# Patient Record
Sex: FEMALE | Race: WHITE | NOT HISPANIC OR LATINO
[De-identification: names, ages, dates, MRNs, and addresses within clinical notes are randomized per-mention and may not be internally consistent; named-entity substitution may affect disease eponyms.]

---

## 2018-04-22 PROBLEM — Z00.00 ENCOUNTER FOR PREVENTIVE HEALTH EXAMINATION: Status: ACTIVE | Noted: 2018-04-22

## 2018-05-10 ENCOUNTER — RECORD ABSTRACTING (OUTPATIENT)
Age: 78
End: 2018-05-10

## 2018-05-10 DIAGNOSIS — Z78.9 OTHER SPECIFIED HEALTH STATUS: ICD-10-CM

## 2018-05-16 ENCOUNTER — APPOINTMENT (OUTPATIENT)
Dept: BREAST CENTER | Facility: CLINIC | Age: 78
End: 2018-05-16

## 2018-05-22 ENCOUNTER — APPOINTMENT (OUTPATIENT)
Dept: BREAST CENTER | Facility: CLINIC | Age: 78
End: 2018-05-22
Payer: MEDICARE

## 2018-05-22 VITALS
BODY MASS INDEX: 36.8 KG/M2 | HEART RATE: 82 BPM | HEIGHT: 62 IN | DIASTOLIC BLOOD PRESSURE: 86 MMHG | SYSTOLIC BLOOD PRESSURE: 171 MMHG | WEIGHT: 200 LBS

## 2018-05-22 PROCEDURE — 99215 OFFICE O/P EST HI 40 MIN: CPT

## 2018-07-31 ENCOUNTER — APPOINTMENT (OUTPATIENT)
Dept: BREAST CENTER | Facility: CLINIC | Age: 78
End: 2018-07-31
Payer: MEDICARE

## 2018-07-31 VITALS
DIASTOLIC BLOOD PRESSURE: 78 MMHG | SYSTOLIC BLOOD PRESSURE: 151 MMHG | BODY MASS INDEX: 36.8 KG/M2 | WEIGHT: 200 LBS | HEIGHT: 62 IN | HEART RATE: 71 BPM

## 2018-07-31 PROCEDURE — 99215 OFFICE O/P EST HI 40 MIN: CPT

## 2019-02-15 ENCOUNTER — APPOINTMENT (OUTPATIENT)
Dept: BREAST CENTER | Facility: CLINIC | Age: 79
End: 2019-02-15
Payer: MEDICARE

## 2019-02-15 VITALS
BODY MASS INDEX: 36.8 KG/M2 | WEIGHT: 200 LBS | HEART RATE: 63 BPM | SYSTOLIC BLOOD PRESSURE: 119 MMHG | DIASTOLIC BLOOD PRESSURE: 62 MMHG | HEIGHT: 62 IN

## 2019-02-15 PROCEDURE — 99215 OFFICE O/P EST HI 40 MIN: CPT

## 2019-02-15 NOTE — REVIEW OF SYSTEMS
[Fever] : fever [Loss Of Hearing] : hearing loss [Cough] : cough [Diarrhea] : diarrhea [Joint Pain] : joint pain [Breast Lump] : breast lump [Hot Flashes] : hot flashes [Negative] : Heme/Lymph

## 2019-02-15 NOTE — HISTORY OF PRESENT ILLNESS
[FreeTextEntry1] : This is a 79 year old female s/p right axillary dissection on 08/18/2017. Pathology showed negative for metastasis in 0/4 lymph nodes, no residual carcinoma identified. Patient is s/p right PMX and right excisional biopsy on 07/13/2017. 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 Patient is s/p right breast MRI guided biopsy on 06/19/2017. Pathology showed right breast outer: mildly dilated ducts in a background of predominantly adipocytic mammary parenchyma with patchy stromal fibrosis, right breast retro: small duct papillomata; duct ectasia; sparse intraluminal calcifications; stromal sclerosis (fibrosis). Patient is s/p right nipple punch biopsy on 06/14/2017. Pathology showed right nipple IDC, grade 5/9, up to 3mm, suspicious for dermal lymphatic invasion, ER/DC positive, HER2 negative, Ki67 low. Breast triple stain shows cytokeratin 8/18 positive epithelial cells without surrounding myoepithelial cells (CK5 an P63 negative). Patient originally referred by Dr. Ty for a right nipple inversion.\par \par Patient completed six months of IV CMF (Dr. Ortiz) on 02/01/2018. Patient was last seen by Dr. Ortiz on 05/10/2018 . Patient is s/p  radiation therapy on  4/20/2018(Dr. Siegel).  She started anastrozole therapy on 5/11/2018. She has c/o of nausea when she is tired since she started it. She also has c/o of insomnia and leg cramps since the anastrozole.  She has taken Zofran for these symptoms with relief. \par \par Patient is s/p left breast usd guided biopsy (heart shaped clip) on 01/26/2018. Pathology showed left retroareolar breast demonstrated focal UDH, duct ectasia with luminal histiocytes and periductal fibrosis sclerosis, apocrine cysts. Patient states the  right arm numbness is improving . Patient c/o  of right breast pain is improving.  The right breast lump  is unchanged. \par \par She completed a CMF chemotherapy on January 2018. She completed radiation therapy on April 20, 2018. A total of 6040 cGy was delivered to the right breast tumor bed. 2 weeks ago she noted to have right arm and breast swelling. She saw Dr. Ortiz and was referred for physical therapy. She has an appointment next week. She had recent negative breast imaging.\par \par \par She is on Arimidex.\par She fell in June at her home in the front yard. She sustained some pain to her right sided thorax. But did not break anything or have any injuries.\par \par She does SBE. \par She has not noticed a change in her breast or any left breast lump. Patient noticed right breast lump. \par She has noticed a change in her nipple or nipple area. \par She has  noticed a change in the skin of the breast.  \par She is not experiencing nipple discharge.\par She is not experiencing any left breast pain. \par She has not noticed a lump or lymph node under the armpit. \par \par BREAST CANCER RISK FACTORS\par Menarche: 11\par Menopause: 44\par Grav: 5     Para:  4\par Age at first live birth: 20\par Nursed: no\par Hysterectomy: no \par Oophorectomy: no\par OCP: yes  on and off for approx 10-15 years\par HRT: no\par Last pap/pelvic exam: 4/2017 WNL\par Related family history:  mother BCA@55\par Ashkenazi: no\par Mastery risk assessment:  BRCA 2.2%, TCv6 6.1%, TCv7 4.7%, Shannon 5.36%, Kirill 0.3%\par BRCA testing: no\par Bra size: 42B\par \par Last mammogram:    01/24/2019                       Location: [facility]\par Report reviewed.                                 Images reviewed on PACS\par Results: Birads 2\par Postsurgical change in the right breast. No evidence of malignancy \par \par Last ultrasound:       2/8/19     Location: [facility] \par Report reviewed.                                 Images reviewed. \par Results: Birads 2\par Left retroareolar duct with echogenic material. USD guided biopsy recommended. Done on 01/26/2018. \par \par Last MRI:    06/12/2017                                            Location: [facility]\par Report reviewed.                                 Images reviewed. \par Results: Birads 4\par Right breast: Nipple retraction with intense enhancement of the retracted nipple. Branching linear enhancement at 11:00 and a focus of nodular enhancement at 9:00; MR guided biopsy recommended. \par Repeat targeted usd of the breast shows a focus of retroareolar ductal dilatation and a probable mildly complicated subcutaneous cyst measuring 3mm at 10:00.  Left breast: No evidence of malignancy\par

## 2019-02-15 NOTE — ASSESSMENT
[FreeTextEntry1] : 79-year-old female followed for right breast cancer stage IIA, TURNER\par Plan bilateral mammogram ultrasound in FEB 2020\par Continue Arimidex and surveillance per Dr. Ortiz, \par Recommend topical vitamin E topical therapy to right breast central\par PT with [facility], reviewed stretching and arm exercises, also advised arm elevation\par Followup with me in 6 months for clinical breast exam\par Will review recommendations for breast MRI at next visit\par She knows to call or return sooner should any concerns or questions arise.\par \par

## 2019-02-15 NOTE — PHYSICAL EXAM
[Normocephalic] : normocephalic [Atraumatic] : atraumatic [Supple] : supple [No Supraclavicular Adenopathy] : no supraclavicular adenopathy [Examined in the supine and seated position] : examined in the supine and seated position [No dominant masses] : no dominant masses in right breast  [No dominant masses] : no dominant masses left breast [No Nipple Retraction] : no left nipple retraction [No Nipple Discharge] : no left nipple discharge [Breast Mass Left Breast ___cm] : no masses [No Axillary Lymphadenopathy] : no left axillary lymphadenopathy [No Edema] : no edema [No Rashes] : no rashes [No Ulceration] : no ulceration [de-identified] : s/p PMx, 3cm firm area of fat necrosis, stable without change central incision, mild brawny induration c/w RTx, bra indentation lines [de-identified] : healed axillary incision, slight arm swelling

## 2019-02-19 ENCOUNTER — APPOINTMENT (OUTPATIENT)
Dept: BREAST CENTER | Facility: CLINIC | Age: 79
End: 2019-02-19

## 2019-07-02 ENCOUNTER — APPOINTMENT (OUTPATIENT)
Dept: BREAST CENTER | Facility: CLINIC | Age: 79
End: 2019-07-02
Payer: MEDICARE

## 2019-07-02 VITALS
DIASTOLIC BLOOD PRESSURE: 83 MMHG | WEIGHT: 200 LBS | HEIGHT: 62 IN | HEART RATE: 73 BPM | SYSTOLIC BLOOD PRESSURE: 158 MMHG | BODY MASS INDEX: 36.8 KG/M2

## 2019-07-02 PROCEDURE — 99214 OFFICE O/P EST MOD 30 MIN: CPT

## 2019-07-02 RX ORDER — ANASTROZOLE TABLETS 1 MG/1
1 TABLET ORAL
Refills: 0 | Status: DISCONTINUED | COMMUNITY
End: 2019-07-02

## 2019-07-02 NOTE — ASSESSMENT
[FreeTextEntry1] : 79-year-old female followed for right breast cancer stage IIA, TURNER\par Plan bilateral mammogram ultrasound in FEB 2020\par Continue Arimidex and surveillance per Dr. Ortiz, \par Recommend topical vitamin E topical therapy to right breast central\par PT with [facility], reviewed stretching and arm exercises, also advised arm elevation\par Followup with me in 6 months for clinical breast exam\par rec hand surgeon and pcp for pannus wound and for evaluation of UE weakness\par Will review recommendations for breast MRI at next visit\par She knows to call or return sooner should any concerns or questions arise.\par \par

## 2019-07-02 NOTE — HISTORY OF PRESENT ILLNESS
[FreeTextEntry1] : This is a 79 year old female s/p right axillary dissection on 08/18/2017. Pathology showed negative for metastasis in 0/4 lymph nodes, no residual carcinoma identified. Patient is s/p right PMX and right excisional biopsy on 07/13/2017. 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 Patient is s/p right breast MRI guided biopsy on 06/19/2017. Pathology showed right breast outer: mildly dilated ducts in a background of predominantly adipocytic mammary parenchyma with patchy stromal fibrosis, right breast retro: small duct papillomata; duct ectasia; sparse intraluminal calcifications; stromal sclerosis (fibrosis). Patient is s/p right nipple punch biopsy on 06/14/2017. 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 Breast triple stain shows cytokeratin 8/18 positive epithelial cells without surrounding myoepithelial cells (CK5 an P63 negative). Patient originally referred by Dr. Ty for a right nipple inversion.\par \par Patient completed six months of IV CMF (Dr. Ortiz) on 02/01/2018. Patient was last seen by Dr. Ortiz on 05/10/2018 . Patient is s/p  radiation therapy on  4/20/2018(Dr. Siegel).  She started anastrozole therapy on 5/11/2018. She has c/o of nausea when she is tired since she started it. She also has c/o of insomnia and leg cramps since the anastrozole.  She has taken Zofran for these symptoms with relief. \par \par Patient is s/p left breast usd guided biopsy (heart shaped clip) on 01/26/2018. Pathology showed left retroareolar breast demonstrated focal UDH, duct ectasia with luminal histiocytes and periductal fibrosis sclerosis, apocrine cysts. Patient states the  right arm numbness is improving . Patient c/o  of right breast pain is improving.  The right breast lump  is unchanged. \par She fell in June 2018 at her home in the front yard. She sustained some pain to her right sided thorax. But did not break anything or have any injuries.\par \par \par She completed a CMF chemotherapy on January 2018. She completed radiation therapy on April 20, 2018. A total of 6040 cGy was delivered to the right breast tumor bed. 2 weeks ago she noted to have right arm and breast swelling. She saw Dr. Ortiz and was referred for physical therapy. She has been seeing [facility] for the past 3 months. She c/o right breast decreased  strength and feels her fingers on her right hand feels "weird" and this sensation wasn't there before. She mentioned to her PT.\par \par She is on Arimidex.\par \par She does SBE. \par She has not noticed a change in her breast or any left breast lump. Patient noticed right breast lump. \par She has noticed a change in her nipple or nipple area. \par She has  noticed a change in the skin of the breast.  \par She is not experiencing nipple discharge.\par She is not experiencing any left breast pain. \par She has not noticed a lump or lymph node under the armpit. \par \par BREAST CANCER RISK FACTORS\par Menarche: 11\par Menopause: 44\par Grav: 5     Para:  4\par Age at first live birth: 20\par Nursed: no\par Hysterectomy: no \par Oophorectomy: no\par OCP: yes  on and off for approx 10-15 years\par HRT: no\par Last pap/pelvic exam: 4/2017 WNL\par Related family history:  mother BCA@55\par Ashkenazi: no\par Mastery risk assessment:  BRCA 2.2%, TCv6 6.1%, TCv7 4.7%, Shannon 5.36%, Kirill 0.3%\par BRCA testing: no\par Bra size: 42B\par \par Last mammogram:    01/24/2019                       Location: [facility]\par Report reviewed.                                 Images reviewed on PACS\par Results: Birads 2\par Postsurgical change in the right breast. No evidence of malignancy \par \par Last ultrasound:       2/8/19     Location: [facility] \par Report reviewed.                                 Images reviewed. \par Results: Birads 2\par Left retroareolar duct with echogenic material. USD guided biopsy recommended. Done on 01/26/2018. \par \par Last MRI:    06/12/2017                                            Location: [facility]\par Report reviewed.                                 Images reviewed. \par Results: Birads 4\par Right breast: Nipple retraction with intense enhancement of the retracted nipple. Branching linear enhancement at 11:00 and a focus of nodular enhancement at 9:00; MR guided biopsy recommended. \par Repeat targeted usd of the breast shows a focus of retroareolar ductal dilatation and a probable mildly complicated subcutaneous cyst measuring 3mm at 10:00.  Left breast: No evidence of malignancy\par

## 2019-07-02 NOTE — PHYSICAL EXAM
[Normocephalic] : normocephalic [Atraumatic] : atraumatic [Supple] : supple [No Supraclavicular Adenopathy] : no supraclavicular adenopathy [Examined in the supine and seated position] : examined in the supine and seated position [No dominant masses] : no dominant masses in right breast  [No dominant masses] : no dominant masses left breast [No Nipple Retraction] : no left nipple retraction [No Nipple Discharge] : no left nipple discharge [Breast Mass Left Breast ___cm] : no masses [No Axillary Lymphadenopathy] : no left axillary lymphadenopathy [No Edema] : no edema [No Rashes] : no rashes [No Ulceration] : no ulceration [No Swelling] : no swelling [Full ROM] : full range of motion [de-identified] : s/p PMx, 2cm firm area of fat necrosis, stable without change central incision, mild brawny induration c/w RTx, bra indentation lines [de-identified] : healed axillary incision,no arm swelling, wearing a sleeve [de-identified] :  strength is equal bilaterally

## 2019-08-09 ENCOUNTER — APPOINTMENT (OUTPATIENT)
Dept: BREAST CENTER | Facility: CLINIC | Age: 79
End: 2019-08-09
Payer: MEDICARE

## 2019-08-09 VITALS
HEIGHT: 62 IN | BODY MASS INDEX: 35.88 KG/M2 | SYSTOLIC BLOOD PRESSURE: 139 MMHG | WEIGHT: 195 LBS | DIASTOLIC BLOOD PRESSURE: 77 MMHG | HEART RATE: 59 BPM

## 2019-08-09 PROCEDURE — 99215 OFFICE O/P EST HI 40 MIN: CPT

## 2019-08-09 NOTE — HISTORY OF PRESENT ILLNESS
[FreeTextEntry1] : This is a 79 year old female s/p right axillary dissection on 08/18/2017. Pathology showed negative for metastasis in 0/4 lymph nodes, no residual carcinoma identified. Patient is s/p right PMX and right excisional biopsy on 07/13/2017. 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 Patient is s/p right breast MRI guided biopsy on 06/19/2017. Pathology showed right breast outer: mildly dilated ducts in a background of predominantly adipocytic mammary parenchyma with patchy stromal fibrosis, right breast retro: small duct papillomata; duct ectasia; sparse intraluminal calcifications; stromal sclerosis (fibrosis). Patient is s/p right nipple punch biopsy on 06/14/2017. Pathology showed right nipple IDC, grade 5/9, up to 3mm, suspicious for dermal lymphatic invasion, ER/OR positive, HER2 negative, Ki67 low. Breast triple stain shows cytokeratin 8/18 positive epithelial cells without surrounding myoepithelial cells (CK5 an P63 negative). Patient originally referred by Dr. Ty for a right nipple inversion.\par \par Patient completed six months of IV CMF (Dr. Ortiz) on 02/01/2018. Patient was last seen by Dr. Ortiz on 05/10/2018 . Patient is s/p  radiation therapy on  4/20/2018(Dr. Siegel).  She started anastrozole therapy on 5/11/2018. She has c/o of nausea when she is tired since she started it. She also has c/o of insomnia and leg cramps since the anastrozole.  She has taken Zofran for these symptoms with relief. \par \par Patient is s/p left breast usd guided biopsy (heart shaped clip) on 01/26/2018. Pathology showed left retroareolar breast demonstrated focal UDH, duct ectasia with luminal histiocytes and periductal fibrosis sclerosis, apocrine cysts. Patient states the  right arm numbness is improving . Patient c/o  of right breast pain is improving.  The right breast lump  is unchanged. \par She fell in June 2018 at her home in the front yard. She sustained some pain to her right sided thorax. But did not break anything or have any injuries.\par \par She completed a CMF chemotherapy on January 2018. She completed radiation therapy on April 20, 2018. A total of 6040 cGy was delivered to the right breast tumor bed. 2 weeks ago she noted to have right arm and breast swelling. She saw Dr. Ortiz and was referred for physical therapy. She has been seeing [facility] for the past 3 months. She c/o right breast decreased  strength and feels her fingers on her right hand feels "weird" and this sensation wasn't there before. She mentioned to her PT.\par \par She is on Arimidex which was recalled and she is now on letrozole for past 2-3 weeks ago. She saw Dr. Cali and was told to follow up with Dr. Ortiz regarding AI toxicity.\par \par She does SBE. \par She has not noticed a change in her breast or any left breast lump. Patient noticed right breast lump. \par She has noticed a change in her nipple or nipple area. \par She has  noticed a change in the skin of the breast.  \par She is not experiencing nipple discharge.\par She is not experiencing any left breast pain. \par She has not noticed a lump or lymph node under the armpit. \par \par BREAST CANCER RISK FACTORS\par Menarche: 11\par Menopause: 44\par Grav: 5     Para:  4\par Age at first live birth: 20\par Nursed: no\par Hysterectomy: no \par Oophorectomy: no\par OCP: yes  on and off for approx 10-15 years\par HRT: no\par Last pap/pelvic exam: 4/2017 WNL\par Related family history:  mother BCA@55\par Ashkenazi: no\par Mastery risk assessment:  BRCA 2.2%, TCv6 6.1%, TCv7 4.7%, Shannon 5.36%, Kirill 0.3%\par BRCA testing: no\par Bra size: 42B\par \par Last mammogram:    01/24/2019                       Location: [facility]\par Report reviewed.                                 Images reviewed on PACS\par Results: Birads 2\par Postsurgical change in the right breast. No evidence of malignancy \par \par Last ultrasound:       2/8/19     Location: [facility] \par Report reviewed.                                 Images reviewed. \par Results: Birads 2\par Left retroareolar duct with echogenic material. USD guided biopsy recommended. Done on 01/26/2018. \par \par Last MRI:    06/12/2017                                            Location: [facility]\par Report reviewed.                                 Images reviewed. \par Results: Birads 4\par Right breast: Nipple retraction with intense enhancement of the retracted nipple. Branching linear enhancement at 11:00 and a focus of nodular enhancement at 9:00; MR guided biopsy recommended. \par Repeat targeted usd of the breast shows a focus of retroareolar ductal dilatation and a probable mildly complicated subcutaneous cyst measuring 3mm at 10:00.  Left breast: No evidence of malignancy\par

## 2019-08-09 NOTE — PHYSICAL EXAM
[Normocephalic] : normocephalic [Atraumatic] : atraumatic [Supple] : supple [No Supraclavicular Adenopathy] : no supraclavicular adenopathy [Examined in the supine and seated position] : examined in the supine and seated position [No dominant masses] : no dominant masses in right breast  [No dominant masses] : no dominant masses left breast [No Nipple Retraction] : no left nipple retraction [No Nipple Discharge] : no left nipple discharge [Breast Mass Left Breast ___cm] : no masses [No Axillary Lymphadenopathy] : no right axillary lymphadenopathy [No Edema] : no edema [Full ROM] : full range of motion [No Swelling] : no swelling [No Rashes] : no rashes [No Ulceration] : no ulceration [de-identified] : s/p PMx, 2cm firm area of fat necrosis, stable without change central incision, mild brawny induration c/w RTx, bra indentation lines [de-identified] :  strength is equal bilaterally [de-identified] : healed axillary incision,no arm swelling, wearing a sleeve, no drainage, motor function intact

## 2019-08-09 NOTE — ASSESSMENT
[FreeTextEntry1] : 79-year-old female followed for right breast cancer stage IIA, TURNER\par Plan bilateral mammogram ultrasound in FEB 2020\par Continue letrozole and surveillance per Dr. Ortiz, \par Recommend topical vitamin E topical therapy to right breast central\par PT with [facility], reviewed stretching and arm exercises, also advised arm elevation\par Followup with me in 6 months for clinical breast exam\par cont follow up of left arm weakness\par Will review recommendations for breast MRI at next visit\par She knows to call or return sooner should any concerns or questions arise.\par \par

## 2019-08-09 NOTE — REVIEW OF SYSTEMS
[Fever] : fever [Cough] : cough [Loss Of Hearing] : hearing loss [Joint Pain] : joint pain [Diarrhea] : diarrhea [Hot Flashes] : hot flashes [Breast Lump] : breast lump [Negative] : Heme/Lymph

## 2020-02-11 ENCOUNTER — APPOINTMENT (OUTPATIENT)
Dept: BREAST CENTER | Facility: CLINIC | Age: 80
End: 2020-02-11
Payer: MEDICARE

## 2020-02-11 VITALS
HEART RATE: 67 BPM | WEIGHT: 200 LBS | HEIGHT: 62 IN | DIASTOLIC BLOOD PRESSURE: 80 MMHG | SYSTOLIC BLOOD PRESSURE: 140 MMHG | BODY MASS INDEX: 36.8 KG/M2

## 2020-02-11 DIAGNOSIS — Z98.890 OTHER SPECIFIED POSTPROCEDURAL STATES: ICD-10-CM

## 2020-02-11 DIAGNOSIS — I10 ESSENTIAL (PRIMARY) HYPERTENSION: ICD-10-CM

## 2020-02-11 PROCEDURE — 99215 OFFICE O/P EST HI 40 MIN: CPT

## 2020-02-11 NOTE — HISTORY OF PRESENT ILLNESS
[FreeTextEntry1] : This is a 80 year old female s/p right axillary dissection on 08/18/2017. Pathology showed negative for metastasis in 0/4 lymph nodes, no residual carcinoma identified. Patient is s/p right PMX and right excisional biopsy on 07/13/2017. 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 Patient is s/p right breast MRI guided biopsy on 06/19/2017. Pathology showed right breast outer: mildly dilated ducts in a background of predominantly adipocytic mammary parenchyma with patchy stromal fibrosis, right breast retro: small duct papillomata; duct ectasia; sparse intraluminal calcifications; stromal sclerosis (fibrosis). Patient is s/p right nipple punch biopsy on 06/14/2017. Pathology showed right nipple IDC, grade 5/9, up to 3mm, suspicious for dermal lymphatic invasion, ER/MT positive, HER2 negative, Ki67 low. Breast triple stain shows cytokeratin 8/18 positive epithelial cells without surrounding myoepithelial cells (CK5 an P63 negative). Patient originally referred by Dr. Ty for a right nipple inversion.\par \par Patient completed six months of IV CMF (Dr. Ortiz) in 8/2019. Patient was last seen by Dr. Ortiz on 05/10/2018 . Patient is s/p  radiation therapy on  4/20/2018(Dr. Siegel).  She started anastrozole therapy on 5/11/2018. She has c/o of nausea when she is tired since she started it. She also has c/o of insomnia and leg cramps since the anastrozole.  She has taken Zofran for these symptoms with relief. \par \par Patient is s/p left breast usd guided biopsy (heart shaped clip) on 01/26/2018. Pathology showed left retroareolar breast demonstrated focal UDH, duct ectasia with luminal histiocytes and periductal fibrosis sclerosis, apocrine cysts. Patient states the  right arm numbness is improving . Patient c/o  of right breast pain is improving.  The right breast lump  is unchanged. \par She fell in June 2018 at her home in the front yard. She sustained some pain to her right sided thorax. But did not break anything or have any injuries.\par \par She completed a CMF chemotherapy on January 2018. She completed radiation therapy on April 20, 2018. A total of 6040 cGy was delivered to the right breast tumor bed. 2 weeks ago she noted to have right arm and breast swelling. She saw Dr. Ortiz and was referred for physical therapy. She has been seeing [facility] for the past 3 months. She c/o right breast decreased  strength and feels her fingers on her right hand feels "weird" and this sensation wasn't there before. She mentioned to her PT.\par \par She is on Arimidex which was recalled and she is now on letrozole for past 2-3 weeks ago. She saw Dr. Cali and was told to follow up with Dr. Ortiz regarding AI toxicity. Her  passed last week.\par \par She does SBE. \par She has not noticed a change in her breast or any left breast lump. Patient noticed right breast lump. \par She has noticed a change in her nipple or nipple area. \par She has  noticed a change in the skin of the breast.  \par She is not experiencing nipple discharge.\par She is not experiencing any left breast pain. \par She has not noticed a lump or lymph node under the armpit. \par \par BREAST CANCER RISK FACTORS\par Menarche: 11\par Menopause: 44\par Grav: 5     Para:  4\par Age at first live birth: 20\par Nursed: no\par Hysterectomy: no \par Oophorectomy: no\par OCP: yes  on and off for approx 10-15 years\par HRT: no\par Last pap/pelvic exam: 4/2017 WNL\par Related family history:  mother BCA@55\par Ashkenazi: no\par Mastery risk assessment:  BRCA 2.2%, TCv6 6.1%, TCv7 4.7%, Shannon 5.36%, Kirill 0.3%\par BRCA testing: no\par Bra size: 42B\par \par Last mammogram:    01/24/2019                       Location: [facility]\par Report reviewed.                                 Images reviewed on PACS\par Results: Birads 2\par Postsurgical change in the right breast. No evidence of malignancy \par \par Last ultrasound:       2/8/19     Location: [facility] \par Report reviewed.                                 Images reviewed. \par Results: Birads 2\par Left retroareolar duct with echogenic material. USD guided biopsy recommended. Done on 01/26/2018. \par \par Last MRI:    06/12/2017                                            Location: [facility]\par Report reviewed.                                 Images reviewed. \par Results: Birads 4\par Right breast: Nipple retraction with intense enhancement of the retracted nipple. Branching linear enhancement at 11:00 and a focus of nodular enhancement at 9:00; MR guided biopsy recommended. \par Repeat targeted usd of the breast shows a focus of retroareolar ductal dilatation and a probable mildly complicated subcutaneous cyst measuring 3mm at 10:00.  Left breast: No evidence of malignancy\par

## 2020-02-11 NOTE — REVIEW OF SYSTEMS
[Loss Of Hearing] : hearing loss [Cough] : cough [Diarrhea] : diarrhea [Joint Pain] : joint pain [Breast Lump] : breast lump [Feeling Tired] : feeling tired [Lower Back Pain] : lower back pain [Easy Bruising] : a tendency for easy bruising [Negative] : Endocrine

## 2020-02-11 NOTE — PHYSICAL EXAM
[Normocephalic] : normocephalic [Atraumatic] : atraumatic [Supple] : supple [No Supraclavicular Adenopathy] : no supraclavicular adenopathy [Examined in the supine and seated position] : examined in the supine and seated position [No dominant masses] : no dominant masses in right breast  [No dominant masses] : no dominant masses left breast [No Nipple Retraction] : no left nipple retraction [No Nipple Discharge] : no left nipple discharge [Breast Mass Left Breast ___cm] : no masses [No Axillary Lymphadenopathy] : no left axillary lymphadenopathy [No Edema] : no edema [No Swelling] : no swelling [Full ROM] : full range of motion [No Rashes] : no rashes [No Ulceration] : no ulceration [Symmetrical] : symmetrical [de-identified] : s/p PMx, 2cm firm area of fat necrosis, stable without change central incision, mild brawny induration c/w RTx, bra indentation lines, improved from prior [de-identified] : healed axillary incision,no arm swelling, wearing a sleeve, no drainage, motor function intact [de-identified] :  strength is equal bilaterally

## 2020-02-11 NOTE — ASSESSMENT
[FreeTextEntry1] : 80-year-old female followed for right breast cancer stage IIA, TURNER\par Plan bilateral mammogram ultrasound in FEB 2020\par Continue letrozole and surveillance per Dr. Ortiz, \par Recommend topical vitamin E topical therapy to right breast central\par PT with [facility], reviewed stretching and arm exercises, also advised arm elevation\par Followup with me in 6 months for clinical breast exam\par \par not recommending breast MRI at this time\par She knows to call or return sooner should any concerns or questions arise.\par \par

## 2020-08-18 ENCOUNTER — APPOINTMENT (OUTPATIENT)
Dept: BREAST CENTER | Facility: CLINIC | Age: 80
End: 2020-08-18
Payer: MEDICARE

## 2020-08-18 VITALS
DIASTOLIC BLOOD PRESSURE: 80 MMHG | WEIGHT: 200 LBS | SYSTOLIC BLOOD PRESSURE: 167 MMHG | BODY MASS INDEX: 36.8 KG/M2 | HEART RATE: 75 BPM | HEIGHT: 62 IN

## 2020-08-18 DIAGNOSIS — B36.9 SUPERFICIAL MYCOSIS, UNSPECIFIED: ICD-10-CM

## 2020-08-18 PROCEDURE — 99215 OFFICE O/P EST HI 40 MIN: CPT

## 2020-08-18 RX ORDER — ATORVASTATIN CALCIUM 80 MG/1
TABLET, FILM COATED ORAL
Refills: 0 | Status: DISCONTINUED | COMMUNITY
End: 2020-08-18

## 2020-08-18 RX ORDER — RISEDRONATE SODIUM 150 MG/1
150 TABLET, FILM COATED ORAL
Refills: 0 | Status: DISCONTINUED | COMMUNITY
End: 2020-08-18

## 2020-08-18 RX ORDER — DOXYCYCLINE HYCLATE 100 MG/1
100 TABLET ORAL
Qty: 20 | Refills: 0 | Status: DISCONTINUED | COMMUNITY
Start: 2020-06-22

## 2020-08-18 RX ORDER — MUPIROCIN 20 MG/G
2 OINTMENT TOPICAL
Qty: 22 | Refills: 0 | Status: DISCONTINUED | COMMUNITY
Start: 2020-06-22

## 2020-08-18 RX ORDER — AMOXICILLIN 500 MG/1
500 TABLET, FILM COATED ORAL
Qty: 21 | Refills: 0 | Status: DISCONTINUED | COMMUNITY
Start: 2020-06-12

## 2020-08-18 RX ORDER — DEXTROMETHORPHAN HBR,GUAIFENESIN,PSEUDOEPHEDRINE HCL 15; 200; 30 MG/5ML; MG/5ML; MG/5ML
SOLUTION ORAL
Refills: 0 | Status: DISCONTINUED | COMMUNITY
End: 2020-08-18

## 2020-08-18 NOTE — REVIEW OF SYSTEMS
[Feeling Tired] : feeling tired [Loss Of Hearing] : hearing loss [Cough] : cough [Diarrhea] : diarrhea [Joint Pain] : joint pain [Lower Back Pain] : lower back pain [Breast Lump] : breast lump [Easy Bruising] : a tendency for easy bruising [Negative] : Endocrine

## 2020-08-18 NOTE — ASSESSMENT
[FreeTextEntry1] : 80-year-old female followed for right breast cancer stage IIA, TURNER\par Plan bilateral mammogram ultrasound asap\par Continue letrozole and surveillance per Dr. Ortiz, \par Recommend topical vitamin E topical therapy to right breast central\par PT with [facility], reviewed stretching and arm exercises, also advised arm elevation\par Followup with me in 6 months for clinical breast exam\par \par not recommending breast MRI at this time\par She knows to call or return sooner should any concerns or questions arise.\par \par

## 2020-08-18 NOTE — PHYSICAL EXAM
[Normocephalic] : normocephalic [Atraumatic] : atraumatic [Supple] : supple [No Supraclavicular Adenopathy] : no supraclavicular adenopathy [Examined in the supine and seated position] : examined in the supine and seated position [No dominant masses] : no dominant masses in right breast  [Symmetrical] : symmetrical [No dominant masses] : no dominant masses left breast [No Nipple Retraction] : no left nipple retraction [No Nipple Discharge] : no left nipple discharge [Breast Mass Left Breast ___cm] : no masses [No Axillary Lymphadenopathy] : no left axillary lymphadenopathy [No Edema] : no edema [No Swelling] : no swelling [Full ROM] : full range of motion [No Rashes] : no rashes [No Ulceration] : no ulceration [de-identified] : s/p PMx, 2cm firm area of fat necrosis, stable without change central incision, mild brawny induration c/w RTx,  improved from prior [de-identified] : healed axillary incision,no arm swelling,  no drainage, motor function intact [de-identified] :  strength is equal bilaterally

## 2020-10-13 ENCOUNTER — APPOINTMENT (OUTPATIENT)
Dept: BREAST CENTER | Facility: CLINIC | Age: 80
End: 2020-10-13
Payer: MEDICARE

## 2020-10-13 VITALS
DIASTOLIC BLOOD PRESSURE: 77 MMHG | BODY MASS INDEX: 36.8 KG/M2 | SYSTOLIC BLOOD PRESSURE: 138 MMHG | HEART RATE: 78 BPM | WEIGHT: 200 LBS | HEIGHT: 62 IN

## 2020-10-13 DIAGNOSIS — M81.0 AGE-RELATED OSTEOPOROSIS W/OUT CURRENT PATHOLOGICAL FRACTURE: ICD-10-CM

## 2020-10-13 DIAGNOSIS — S22.31XA FRACTURE OF ONE RIB, RIGHT SIDE, INITIAL ENCOUNTER FOR CLOSED FRACTURE: ICD-10-CM

## 2020-10-13 PROCEDURE — 99215 OFFICE O/P EST HI 40 MIN: CPT

## 2020-10-13 NOTE — PHYSICAL EXAM
[Normocephalic] : normocephalic [Atraumatic] : atraumatic [Supple] : supple [No Supraclavicular Adenopathy] : no supraclavicular adenopathy [Examined in the supine and seated position] : examined in the supine and seated position [Symmetrical] : symmetrical [No dominant masses] : no dominant masses in right breast  [No dominant masses] : no dominant masses left breast [No Nipple Retraction] : no left nipple retraction [No Nipple Discharge] : no left nipple discharge [Breast Mass Left Breast ___cm] : no masses [No Axillary Lymphadenopathy] : no left axillary lymphadenopathy [No Edema] : no edema [No Swelling] : no swelling [Full ROM] : full range of motion [No Rashes] : no rashes [No Ulceration] : no ulceration [de-identified] : s/p PMx, 2cm firm area of fat necrosis, stable without change central incision, mild brawny induration c/w RTx,  improved from prior [de-identified] : healed axillary incision,no arm swelling,  no drainage, motor function intact [de-identified] :  strength is equal bilaterally

## 2020-10-13 NOTE — ASSESSMENT
[FreeTextEntry1] : 80-year-old female followed for right breast cancer stage IIA, TURNER\par Plan bilateral mammogram ultrasound 9/2021\par Continue letrozole and surveillance per Dr. Ortiz, \par Recommend topical vitamin E topical therapy to right breast central\par PT with [facility], reviewed stretching and arm exercises, also advised arm elevation\par Followup with me in 4 months for clinical breast exam\par \par not recommending breast MRI at this time\par She knows to call or return sooner should any concerns or questions arise.\par \par

## 2020-10-13 NOTE — HISTORY OF PRESENT ILLNESS
[FreeTextEntry1] : This is a 80 year old female s/p right axillary dissection on 08/18/2017. Pathology showed negative for metastasis in 0/4 lymph nodes, no residual carcinoma identified. Patient is s/p right PMX and right excisional biopsy on 07/13/2017. 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 Patient is s/p right breast MRI guided biopsy on 06/19/2017. Pathology showed right breast outer: mildly dilated ducts in a background of predominantly adipocytic mammary parenchyma with patchy stromal fibrosis, right breast retro: small duct papillomata; duct ectasia; sparse intraluminal calcifications; stromal sclerosis (fibrosis). Patient is s/p right nipple punch biopsy on 06/14/2017. Pathology showed right nipple IDC, grade 5/9, up to 3mm, suspicious for dermal lymphatic invasion, ER/GA positive, HER2 negative, Ki67 low. Breast triple stain shows cytokeratin 8/18 positive epithelial cells without surrounding myoepithelial cells (CK5 an P63 negative). Patient originally referred by Dr. Ty for a right nipple inversion.\par \par Patient completed six months of IV CMF (Dr. Ortiz) in 8/2019. Patient was last seen by Dr. Ortiz on 9/10/2020 . Patient is s/p  radiation therapy on  4/20/2018(Dr. Siegel).  She started anastrozole therapy on 5/11/2018. She has c/o of nausea when she is tired since she started it. She also has c/o of insomnia and leg cramps since the anastrozole.  She has taken Zofran for these symptoms with relief. \par \par Patient is s/p left breast usd guided biopsy (heart shaped clip) on 01/26/2018. Pathology showed left retroareolar breast demonstrated focal UDH, duct ectasia with luminal histiocytes and periductal fibrosis sclerosis, apocrine cysts. Patient states the  right arm numbness is improving . Patient c/o  of right breast pain is improving.  \par She fell in June 2018 at her home in the front yard. She sustained some pain to her right sided thorax. But did not break anything or have any injuries.\par \par She completed a CMF chemotherapy on January 2018. She completed radiation therapy on April 20, 2018. A total of 6040 cGy was delivered to the right breast tumor bed. 2 weeks ago she noted to have right arm and breast swelling. She saw Dr. Ortiz and was referred for physical therapy. She has been seeing [facility] for the past 3 months. She c/o right breast decreased  strength and feels her fingers on her right hand feels "weird" and this sensation wasn't there before. She mentioned to her PT.\par \par She is on Arimidex which was recalled and she is now on letrozole for past 2-3 weeks ago. She saw Dr. Cali and was told to follow up with Dr. Ortiz regarding AI toxicity. Her  passed 2/2020.\par \par She was found to have rib fx on right on recent CT chest 9/2020. \par \par She does SBE. \par She has not noticed a change in her breast or any left breast lump. Patient noticed right breast lump. \par She has noticed a change in her nipple or nipple area. \par She has  noticed a change in the skin of the breast.  \par She is not experiencing nipple discharge.\par She is not experiencing any left breast pain. \par She has not noticed a lump or lymph node under the armpit. \par \par BREAST CANCER RISK FACTORS\par Menarche: 11\par Menopause: 44\par Grav: 5     Para:  4\par Age at first live birth: 20\par Nursed: no\par Hysterectomy: no \par Oophorectomy: no\par OCP: yes  on and off for approx 10-15 years\par HRT: no\par Last pap/pelvic exam: 4/2017 WNL\par Related family history:  mother BCA@55\par Ashkenazi: no\par Mastery risk assessment:  BRCA 2.2%, TCv6 6.1%, TCv7 4.7%, Shannon 5.36%, Kirill 0.3%\par BRCA testing: no\par Bra size: 42B\par \par Last mammogram:   9/1/2020                      Location: [facility]\par Report reviewed.                                 Images reviewed on PACS\par Results: Birads 2\par Postsurgical change in the right breast. No evidence of malignancy \par \par Last ultrasound:   9/1/2020     Location: [facility]\par Report reviewed.                                 Images reviewed. \par Results: Birads 2\par Left retroareolar duct with echogenic material. USD guided biopsy recommended. Done on 01/26/2018. \par \par Last MRI:    06/12/2017                                            Location: [facility]\par Report reviewed.                                 Images reviewed. \par Results: Birads 4\par Right breast: Nipple retraction with intense enhancement of the retracted nipple. Branching linear enhancement at 11:00 and a focus of nodular enhancement at 9:00; MR guided biopsy recommended. \par Repeat targeted usd of the breast shows a focus of retroareolar ductal dilatation and a probable mildly complicated subcutaneous cyst measuring 3mm at 10:00.  Left breast: No evidence of malignancy\par

## 2021-02-23 ENCOUNTER — APPOINTMENT (OUTPATIENT)
Dept: BREAST CENTER | Facility: CLINIC | Age: 81
End: 2021-02-23
Payer: MEDICARE

## 2021-02-23 VITALS
SYSTOLIC BLOOD PRESSURE: 129 MMHG | HEIGHT: 62 IN | BODY MASS INDEX: 36.8 KG/M2 | HEART RATE: 77 BPM | DIASTOLIC BLOOD PRESSURE: 69 MMHG | WEIGHT: 200 LBS

## 2021-02-23 PROCEDURE — 99072 ADDL SUPL MATRL&STAF TM PHE: CPT

## 2021-02-23 PROCEDURE — 99215 OFFICE O/P EST HI 40 MIN: CPT

## 2021-02-23 RX ORDER — HYDROCHLOROTHIAZIDE 12.5 MG/1
12.5 TABLET ORAL
Qty: 90 | Refills: 0 | Status: DISCONTINUED | COMMUNITY
Start: 2020-03-18 | End: 2021-02-23

## 2021-02-23 RX ORDER — HYDROCORTISONE 25 MG/G
2.5 CREAM TOPICAL
Qty: 28 | Refills: 0 | Status: DISCONTINUED | COMMUNITY
Start: 2020-05-14 | End: 2021-02-23

## 2021-02-23 RX ORDER — IBANDRONATE SODIUM 150 MG/1
150 TABLET ORAL
Qty: 3 | Refills: 0 | Status: DISCONTINUED | COMMUNITY
Start: 2020-08-17 | End: 2021-02-23

## 2021-02-23 NOTE — HISTORY OF PRESENT ILLNESS
[FreeTextEntry1] : This is a 81 year old female s/p right axillary dissection on 08/18/2017. Pathology showed negative for metastasis in 0/4 lymph nodes, no residual carcinoma identified. Patient is s/p right PMX and right excisional biopsy on 07/13/2017. 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 Patient is s/p right breast MRI guided biopsy on 06/19/2017. Pathology showed right breast outer: mildly dilated ducts in a background of predominantly adipocytic mammary parenchyma with patchy stromal fibrosis, right breast retro: small duct papillomata; duct ectasia; sparse intraluminal calcifications; stromal sclerosis (fibrosis). Patient is s/p right nipple punch biopsy on 06/14/2017. Pathology showed right nipple IDC, grade 5/9, up to 3mm, suspicious for dermal lymphatic invasion, ER/KY positive, HER2 negative, Ki67 low. Breast triple stain shows cytokeratin 8/18 positive epithelial cells without surrounding myoepithelial cells (CK5 an P63 negative). Patient originally referred by Dr. Ty for a right nipple inversion.\par \par Patient completed six months of IV CMF (Dr. Ortiz) in 8/2019. Patient was last seen by Dr. Ortiz on 9/10/2020 . Patient is s/p  radiation therapy on  4/20/2018(Dr. Siegel).  She started anastrozole therapy on 5/11/2018. She has c/o of nausea when she is tired since she started it. She also has c/o of insomnia and leg cramps since the anastrozole.  She has taken Zofran for these symptoms with relief. \par \par Patient is s/p left breast usd guided biopsy (heart shaped clip) on 01/26/2018. Pathology showed left retroareolar breast demonstrated focal UDH, duct ectasia with luminal histiocytes and periductal fibrosis sclerosis, apocrine cysts. Patient states the  right arm numbness is improving . Patient c/o  of right breast pain is improving.  \par She fell in June 2018 at her home in the front yard. She sustained some pain to her right sided thorax. But did not break anything or have any injuries.\par \par She completed a CMF chemotherapy on January 2018. She completed radiation therapy on April 20, 2018. A total of 6040 cGy was delivered to the right breast tumor bed. 2 weeks ago she noted to have right arm and breast swelling. She saw Dr. Ortiz and was referred for physical therapy. She has been seeing [facility] for the past 3 months. She c/o right breast decreased  strength and feels her fingers on her right hand feels "weird" and this sensation wasn't there before. She mentioned to her PT.\par \par She is on Arimidex which was recalled and she is now on letrozole for past 2-3 weeks ago. She saw Dr. Cali and was told to follow up with Dr. Ortiz regarding AI toxicity. Her  passed 2/2020.\par \par She was found to have rib fx on right on recent CT chest 9/2020. She had a cardiac cath with Dr. Gallego she had a slight blockage 30% and has been feeling better since the cath. \par \par She does SBE. \par She has not noticed a change in her breast or any left breast lump. Patient noticed right breast lump. \par She has noticed a change in her nipple or nipple area. \par She has  noticed a change in the skin of the breast.  \par She is not experiencing nipple discharge.\par She is not experiencing any left breast pain. \par She has not noticed a lump or lymph node under the armpit. \par \par BREAST CANCER RISK FACTORS\par Menarche: 11\par Menopause: 44\par Grav: 5     Para:  4\par Age at first live birth: 20\par Nursed: no\par Hysterectomy: no \par Oophorectomy: no\par OCP: yes  on and off for approx 10-15 years\par HRT: no\par Last pap/pelvic exam: 4/2017 WNL\par Related family history:  mother BCA@55\par Ashkenazi: no\par Mastery risk assessment:  BRCA 2.2%, TCv6 6.1%, TCv7 4.7%, Shannon 5.36%, Kirill 0.3%\par BRCA testing: no\par Bra size: 42B\par \par Last mammogram:   9/1/2020                      Location: [facility]\par Report reviewed.                                 Images reviewed on PACS\par Results: Birads 2\par Postsurgical change in the right breast. No evidence of malignancy \par \par Last ultrasound:   9/1/2020     Location: [facility]\par Report reviewed.                                 Images reviewed. \par Results: Birads 2\par Left retroareolar duct with echogenic material. USD guided biopsy recommended. Done on 01/26/2018. \par \par Last MRI:    06/12/2017                                            Location: [facility]\par Report reviewed.                                 Images reviewed. \par Results: Birads 4\par Right breast: Nipple retraction with intense enhancement of the retracted nipple. Branching linear enhancement at 11:00 and a focus of nodular enhancement at 9:00; MR guided biopsy recommended. \par Repeat targeted usd of the breast shows a focus of retroareolar ductal dilatation and a probable mildly complicated subcutaneous cyst measuring 3mm at 10:00.  Left breast: No evidence of malignancy\par

## 2021-02-23 NOTE — PHYSICAL EXAM
[Normocephalic] : normocephalic [Atraumatic] : atraumatic [Supple] : supple [No Supraclavicular Adenopathy] : no supraclavicular adenopathy [Examined in the supine and seated position] : examined in the supine and seated position [Symmetrical] : symmetrical [No dominant masses] : no dominant masses in right breast  [No dominant masses] : no dominant masses left breast [No Nipple Retraction] : no left nipple retraction [No Nipple Discharge] : no left nipple discharge [Breast Mass Left Breast ___cm] : no masses [No Axillary Lymphadenopathy] : no right axillary lymphadenopathy [No Edema] : no edema [No Swelling] : no swelling [Full ROM] : full range of motion [No Rashes] : no rashes [No Ulceration] : no ulceration [de-identified] : s/p PMx, 2cm firm area of fat necrosis, stable without change central incision, mild brawny induration c/w RTx,  improved from prior [de-identified] : healed axillary incision,no arm swelling,  no drainage, motor function intact [de-identified] :  strength is equal bilaterally

## 2021-02-23 NOTE — REVIEW OF SYSTEMS
[Feeling Tired] : feeling tired [Loss Of Hearing] : hearing loss [Cough] : cough [Diarrhea] : diarrhea [Joint Pain] : joint pain [Lower Back Pain] : lower back pain [Breast Lump] : breast lump [Easy Bruising] : a tendency for easy bruising [Negative] : Psychiatric

## 2021-02-23 NOTE — ASSESSMENT
[FreeTextEntry1] : 81year-old female followed for right breast cancer stage IIA, TURNER\par Plan bilateral mammogram ultrasound 9/2021\par Continue AI and surveillance per Dr. Ortiz, \par Recommend topical vitamin E topical therapy to right breast central prn\par not recommending breast MRI at this time\par f/u after imaging\par She knows to call or return sooner should any concerns or questions arise.\par \par

## 2021-09-03 ENCOUNTER — NON-APPOINTMENT (OUTPATIENT)
Age: 81
End: 2021-09-03

## 2021-09-14 ENCOUNTER — APPOINTMENT (OUTPATIENT)
Dept: BREAST CENTER | Facility: CLINIC | Age: 81
End: 2021-09-14
Payer: MEDICARE

## 2021-09-14 VITALS
DIASTOLIC BLOOD PRESSURE: 76 MMHG | SYSTOLIC BLOOD PRESSURE: 144 MMHG | HEART RATE: 67 BPM | BODY MASS INDEX: 36.8 KG/M2 | HEIGHT: 62 IN | WEIGHT: 200 LBS

## 2021-09-14 DIAGNOSIS — Z82.49 FAMILY HISTORY OF ISCHEMIC HEART DISEASE AND OTHER DISEASES OF THE CIRCULATORY SYSTEM: ICD-10-CM

## 2021-09-14 DIAGNOSIS — Z80.1 FAMILY HISTORY OF MALIGNANT NEOPLASM OF TRACHEA, BRONCHUS AND LUNG: ICD-10-CM

## 2021-09-14 PROCEDURE — 99215 OFFICE O/P EST HI 40 MIN: CPT

## 2021-09-14 RX ORDER — NYSTATIN AND TRIAMCINOLONE ACETONIDE 100000; 1 MG/G; MG/G
100000-0.1 CREAM TOPICAL TWICE DAILY
Qty: 90 | Refills: 5 | Status: DISCONTINUED | COMMUNITY
End: 2021-09-14

## 2021-09-14 NOTE — HISTORY OF PRESENT ILLNESS
[FreeTextEntry1] : This is a 81 year old female s/p right axillary dissection on 08/18/2017. Pathology showed negative for metastasis in 0/4 lymph nodes, no residual carcinoma identified. Patient is s/p right PMX and right excisional biopsy on 07/13/2017. 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 Patient is s/p right breast MRI guided biopsy on 06/19/2017. Pathology showed right breast outer: mildly dilated ducts in a background of predominantly adipocytic mammary parenchyma with patchy stromal fibrosis, right breast retro: small duct papillomata; duct ectasia; sparse intraluminal calcifications; stromal sclerosis (fibrosis). Patient is s/p right nipple punch biopsy on 06/14/2017. Pathology showed right nipple IDC, grade 5/9, up to 3mm, suspicious for dermal lymphatic invasion, ER/NV positive, HER2 negative, Ki67 low. Breast triple stain shows cytokeratin 8/18 positive epithelial cells without surrounding myoepithelial cells (CK5 an P63 negative). Patient originally referred by Dr. Ty for a right nipple inversion.\par \par Patient completed six months of IV CMF (Dr. Ortiz) in 8/2019. Patient was last seen by Dr. Ortiz on 9/10/2020 . Patient is s/p  radiation therapy on  4/20/2018(Dr. Siegel).  She started anastrozole therapy on 5/11/2018. She has c/o of nausea when she is tired since she started it. She also has c/o of insomnia and leg cramps since the anastrozole.  She has taken Zofran for these symptoms with relief. \par \par Patient is s/p left breast usd guided biopsy (heart shaped clip) on 01/26/2018. Pathology showed left retroareolar breast demonstrated focal UDH, duct ectasia with luminal histiocytes and periductal fibrosis sclerosis, apocrine cysts. Patient states the  right arm numbness is improving . Patient c/o  of right breast pain is improving.  \par She fell in June 2018 at her home in the front yard. She sustained some pain to her right sided thorax. But did not break anything or have any injuries.\par \par She completed a CMF chemotherapy on January 2018. She completed radiation therapy on April 20, 2018. A total of 6040 cGy was delivered to the right breast tumor bed. 2 weeks ago she noted to have right arm and breast swelling. She saw Dr. Ortiz and was referred for physical therapy. She has been seeing [facility] for the past 3 months. She c/o right breast decreased  strength and feels her fingers on her right hand feels "weird" and this sensation wasn't there before. She mentioned to her PT.\par \par She is on Arimidex which was recalled and she is now on letrozole for past 2-3 weeks ago. She saw Dr. Cali and was told to follow up with Dr. Ortiz regarding AI toxicity. Her  passed 2/2020.\par \par She was found to have rib fx on right on recent CT chest 9/2020. She had a cardiac cath with Dr. Gallego she had a slight blockage 30% and has been feeling better since the cath. \par \par She had call back imaging today and needs right breast stereotactic bx. She has some depression related to her husbands loss.\par \par She does SBE. \par She has not noticed a change in her breast or any left breast lump. Patient noticed right breast lump. \par She has noticed a change in her nipple or nipple area. \par She has  noticed a change in the skin of the breast.  \par She is not experiencing nipple discharge.\par She is not experiencing any left breast pain. \par She has not noticed a lump or lymph node under the armpit. \par \par BREAST CANCER RISK FACTORS\par Menarche: 11\par Menopause: 44\par Grav: 5     Para:  4\par Age at first live birth: 20\par Nursed: no\par Hysterectomy: no \par Oophorectomy: no\par OCP: yes  on and off for approx 10-15 years\par HRT: no\par Last pap/pelvic exam: 4/2017 WNL\par Related family history:  mother BCA@55\par Ashkenazi: no\par Mastery risk assessment:  BRCA 2.2%, TCv6 6.1%, TCv7 4.7%, Shannon 5.36%, Kirill 0.3%\par BRCA testing: no\par Bra size: 42B\par \par Last mammogram:   9/14/2021right                Location: [place]\par Report reviewed.                                 Images reviewed on PACS\par Results: Birads 4B\par indeterminate calcifications in the lateral right breast, This may be a second area of evolving fat necrosis, however malignancy cannot be excluded. stereotactic biopsy is necessary, \par \par Last ultrasound:   9/2/2021                  Location: [place]\par Report reviewed.                                 Images reviewed. \par Results: Birads 0\par no suspicious ultrasound abnormalities. \par \par Last MRI:    06/12/2017                        Location: [facility]\par Report reviewed.                                 Images reviewed. \par Results: Birads 4\par Right breast: Nipple retraction with intense enhancement of the retracted nipple. Branching linear enhancement at 11:00 and a focus of nodular enhancement at 9:00; MR guided biopsy recommended. \par Repeat targeted usd of the breast shows a focus of retroareolar ductal dilatation and a probable mildly complicated subcutaneous cyst measuring 3mm at 10:00.  Left breast: No evidence of malignancy\par

## 2021-09-14 NOTE — REVIEW OF SYSTEMS
[Feeling Tired] : feeling tired [Loss Of Hearing] : hearing loss [Cough] : cough [Diarrhea] : diarrhea [Lower Back Pain] : lower back pain [Breast Lump] : breast lump [Easy Bruising] : a tendency for easy bruising [Negative] : Endocrine

## 2021-09-14 NOTE — PHYSICAL EXAM
[Normocephalic] : normocephalic [Atraumatic] : atraumatic [Supple] : supple [No Supraclavicular Adenopathy] : no supraclavicular adenopathy [Examined in the supine and seated position] : examined in the supine and seated position [Symmetrical] : symmetrical [No dominant masses] : no dominant masses left breast [No dominant masses] : no dominant masses in right breast  [No Nipple Retraction] : no left nipple retraction [No Nipple Discharge] : no left nipple discharge [Breast Mass Left Breast ___cm] : no masses [No Axillary Lymphadenopathy] : no left axillary lymphadenopathy [No Edema] : no edema [No Swelling] : no swelling [Full ROM] : full range of motion [No Rashes] : no rashes [No Ulceration] : no ulceration [de-identified] : s/p PMx, 3-4cm firm area of fat necrosis, stable without change central incision, mild brawny induration c/w RTx,  stable from prior [de-identified] : healed axillary incision,no arm swelling,  no drainage, motor function intact [de-identified] :  strength is equal bilaterally

## 2021-09-14 NOTE — ASSESSMENT
[FreeTextEntry1] : 81 year-old female followed for right breast cancer stage IIA, TURNER\par plan right stereo asap\par if negative plan right mammo 3/2022\par Plan bilateral mammogram ultrasound 9/2022\par Continue AI and surveillance per Dr. Ortiz\par provided reassurance emotionally, she is declining counseling at this time for depression\par Recommend topical vitamin E topical therapy to right breast central prn\par not recommending breast MRI at this time\par f/u after imaging\par She knows to call or return sooner should any concerns or questions arise.\par \par

## 2022-03-22 ENCOUNTER — NON-APPOINTMENT (OUTPATIENT)
Age: 82
End: 2022-03-22

## 2022-03-22 ENCOUNTER — APPOINTMENT (OUTPATIENT)
Dept: BREAST CENTER | Facility: CLINIC | Age: 82
End: 2022-03-22
Payer: MEDICARE

## 2022-03-22 VITALS
BODY MASS INDEX: 36.8 KG/M2 | SYSTOLIC BLOOD PRESSURE: 136 MMHG | HEART RATE: 75 BPM | WEIGHT: 200 LBS | HEIGHT: 62 IN | DIASTOLIC BLOOD PRESSURE: 80 MMHG

## 2022-03-22 DIAGNOSIS — Z92.3 PERSONAL HISTORY OF IRRADIATION: ICD-10-CM

## 2022-03-22 DIAGNOSIS — Z80.3 FAMILY HISTORY OF MALIGNANT NEOPLASM OF BREAST: ICD-10-CM

## 2022-03-22 DIAGNOSIS — Z87.81 PERSONAL HISTORY OF (HEALED) TRAUMATIC FRACTURE: ICD-10-CM

## 2022-03-22 PROCEDURE — 99213 OFFICE O/P EST LOW 20 MIN: CPT

## 2022-03-22 NOTE — HISTORY OF PRESENT ILLNESS
[FreeTextEntry1] : This is a 82 year old female s/p right axillary dissection on 08/18/2017. Pathology showed negative for metastasis in 0/4 lymph nodes, no residual carcinoma identified. Patient is s/p right PMX and right excisional biopsy on 07/13/2017. 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 Patient is s/p right breast MRI guided biopsy on 06/19/2017. Pathology showed right breast outer: mildly dilated ducts in a background of predominantly adipocytic mammary parenchyma with patchy stromal fibrosis, right breast retro: small duct papillomata; duct ectasia; sparse intraluminal calcifications; stromal sclerosis (fibrosis). Patient is s/p right nipple punch biopsy on 06/14/2017. Pathology showed right nipple IDC, grade 5/9, up to 3mm, suspicious for dermal lymphatic invasion, ER/LA positive, HER2 negative, Ki67 low. Breast triple stain shows cytokeratin 8/18 positive epithelial cells without surrounding myoepithelial cells (CK5 an P63 negative). Patient originally referred by Dr. Ty for a right nipple inversion.\par \par Patient completed six months of IV CMF (Dr. Ortiz) in 8/2019. Patient was last seen by Dr. Ortiz on 9/10/2020 . Patient is s/p  radiation therapy on  4/20/2018(Dr. Siegel).  She started anastrozole therapy on 5/11/2018. She has c/o of nausea when she is tired since she started it. She also has c/o of insomnia and leg cramps since the anastrozole.  She has taken Zofran for these symptoms with relief. \par \par Patient is s/p left breast usd guided biopsy (heart shaped clip) on 01/26/2018. Pathology showed left retroareolar breast demonstrated focal UDH, duct ectasia with luminal histiocytes and periductal fibrosis sclerosis, apocrine cysts. Patient states the  right arm numbness is improving . Patient c/o  of right breast pain is improving.  \par She fell in June 2018 at her home in the front yard. She sustained some pain to her right sided thorax. But did not break anything or have any injuries.\par \par She completed a CMF chemotherapy on January 2018. She completed radiation therapy on April 20, 2018. A total of 6040 cGy was delivered to the right breast tumor bed. 2 weeks ago she noted to have right arm and breast swelling. She saw Dr. Ortiz and was referred for physical therapy. She has been seeing [facility] for the past 3 months. She c/o right breast decreased  strength and feels her fingers on her right hand feels "weird" and this sensation wasn't there before. She mentioned to her PT.\par \par She is on Arimidex which was recalled and she is now on letrozole for past 2-3 weeks ago. She saw Dr. Cali and was told to follow up with Dr. Ortiz regarding AI toxicity. Her  passed 2/2020.\par \par She was found to have rib fx on right on recent CT chest 9/2020. She had a cardiac cath with Dr. Gallego she had a slight blockage 30% and has been feeling better since the cath. \par \par She had call back imaging today and needs right breast stereotactic bx. She has some depression related to her husbands loss.\par \par She is s/p right breast stereotactic biopsy on 9/24/2021, pathology showed benign breast tissue with dense fibrous scar associated with calcifications.\par \par She had a left knee patellar fx post fall at the [employer] [place]. She has upcoming heart surgery at [facility].\par \par She does SBE. \par She has not noticed a change in her breast or any left breast lump. Patient noticed right breast lump. \par She has noticed a change in her nipple or nipple area. \par She has  noticed a change in the skin of the breast.  \par She is not experiencing nipple discharge.\par She is not experiencing any left breast pain. \par She has not noticed a lump or lymph node under the armpit. \par \par BREAST CANCER RISK FACTORS\par Menarche: 11\par Menopause: 44\par Grav: 5     Para:  4\par Age at first live birth: 20\par Nursed: no\par Hysterectomy: no \par Oophorectomy: no\par OCP: yes  on and off for approx 10-15 years\par HRT: no\par Last pap/pelvic exam: 4/2017 WNL\par Related family history:  mother BCA@55\par Ashkenazi: no\par Mastery risk assessment:  BRCA 2.2%, TCv6 6.1%, TCv7 4.7%, Shannon 5.36%, Kirill 0.3%\par BRCA testing: no\par Bra size: 42B\par \par Last mammogram:   9/14/2021 right                Location: [place]\par Report reviewed.                                 Images reviewed on PACS\par Results: Birads 4B\par indeterminate calcifications in the lateral right breast, This may be a second area of evolving fat necrosis, however malignancy cannot be excluded. stereotactic biopsy is necessary, \par \par Last ultrasound:   9/2/2021                  Location: [place]\par Report reviewed.                                 Images reviewed. \par Results: Birads 0\par no suspicious ultrasound abnormalities. \par \par Last MRI:    06/12/2017                        Location: [facility]\par Report reviewed.                                 Images reviewed. \par Results: Birads 4\par Right breast: Nipple retraction with intense enhancement of the retracted nipple. Branching linear enhancement at 11:00 and a focus of nodular enhancement at 9:00; MR guided biopsy recommended. \par Repeat targeted usd of the breast shows a focus of retroareolar ductal dilatation and a probable mildly complicated subcutaneous cyst measuring 3mm at 10:00.  Left breast: No evidence of malignancy\par

## 2022-03-22 NOTE — ASSESSMENT
[FreeTextEntry1] : 82 year-old female followed for right breast cancer stage IIA, TURNER\par path reviewed and copy given\par Plan bilateral mammogram ultrasound 9/2022\par Continue AI and surveillance per Dr. Ortiz\par Recommend topical vitamin E topical therapy to right breast central prn\par not recommending breast MRI at this time\par f/u after imaging\par She knows to call or return sooner should any concerns or questions arise.\par \par

## 2022-03-22 NOTE — PHYSICAL EXAM
[Normocephalic] : normocephalic [Atraumatic] : atraumatic [Supple] : supple [No Supraclavicular Adenopathy] : no supraclavicular adenopathy [Examined in the supine and seated position] : examined in the supine and seated position [Symmetrical] : symmetrical [No dominant masses] : no dominant masses in right breast  [No dominant masses] : no dominant masses left breast [No Nipple Retraction] : no left nipple retraction [No Nipple Discharge] : no left nipple discharge [Breast Mass Left Breast ___cm] : no masses [No Axillary Lymphadenopathy] : no left axillary lymphadenopathy [No Edema] : no edema [No Swelling] : no swelling [Full ROM] : full range of motion [No Rashes] : no rashes [No Ulceration] : no ulceration [de-identified] : s/p PMx, 3-4cm firm area of fat necrosis, stable without change central incision, mild brawny induration c/w RTx,  stable from prior [de-identified] : healed axillary incision,no arm swelling,  no drainage, motor function intact [de-identified] :  strength is equal bilaterally

## 2022-03-25 ENCOUNTER — APPOINTMENT (OUTPATIENT)
Dept: HEART AND VASCULAR | Facility: CLINIC | Age: 82
End: 2022-03-25

## 2022-04-01 ENCOUNTER — APPOINTMENT (OUTPATIENT)
Dept: HEART AND VASCULAR | Facility: CLINIC | Age: 82
End: 2022-04-01
Payer: MEDICARE

## 2022-04-01 VITALS
HEART RATE: 67 BPM | BODY MASS INDEX: 36.8 KG/M2 | WEIGHT: 200 LBS | DIASTOLIC BLOOD PRESSURE: 90 MMHG | OXYGEN SATURATION: 98 % | HEIGHT: 62 IN | SYSTOLIC BLOOD PRESSURE: 150 MMHG | TEMPERATURE: 98.7 F

## 2022-04-01 PROCEDURE — 99204 OFFICE O/P NEW MOD 45 MIN: CPT

## 2022-04-02 NOTE — ASSESSMENT
MAILED PT ORDER [FreeTextEntry1] : Chronic diastolic heart failure, severe AS, nonobstructive CAD, HTN, HLD\par -Structural CT at [place]\par -TEB with Dr. Allen\par -TAVR plan when knee brace is off (end of April)\par -We discussed at length options for aortic valve replacement (SAVR vs TAVR). After discussion including benefits/risks of TAVR (including but not limited to stroke, death, PVL, need for pacemaker, vascular complications, prolonged hospitalization) the patient would like to proceed with TAVR.\par -continue ASA, losartan/statin for HTN/HLD and CAD.\par

## 2022-04-02 NOTE — REASON FOR VISIT
[Structural Heart and Valve Disease] : structural heart and valve disease [Coronary Artery Disease] : coronary artery disease [FreeTextEntry1] : 83 y/o F with pmhx of HTN, hx of breast cancer (s/p lumpectomy on anastrozle), chronic diastolic heart failure, severe AS (CARMELITA 1cm2, MG 34mmHg, PG 71.4mmHg), CAD (nonobstructive) who presents with NYHA IIIa symptoms, gradual worsening.\par No syncope/CP\par She recently had orthopedic injury to her L. knee and cast is coming off next week.\par \par Cardiac cath 2/2021- nonobstructive CAD (images reviewed from 2/2021 cath), co-dominant system\par TTE 2/18/2022- LVEF 65%, mild LVH, CARMELITA 1.1cm2, PG 71mmHg, MG 34mmhg\par EKG 2/2022- reviewed, NSR, normal EKG

## 2022-04-02 NOTE — PHYSICAL EXAM
[Well Nourished] : well nourished [Well Developed] : well developed [No Acute Distress] : no acute distress [Normal Conjunctiva] : normal conjunctiva [Normal Venous Pressure] : normal venous pressure [No Carotid Bruit] : no carotid bruit [No Rub] : no rub [No Gallop] : no gallop [Clear Lung Fields] : clear lung fields [Good Air Entry] : good air entry [Soft] : abdomen soft [No Respiratory Distress] : no respiratory distress  [Non Tender] : non-tender [No Masses/organomegaly] : no masses/organomegaly [Normal Bowel Sounds] : normal bowel sounds [Normal Gait] : normal gait [No Edema] : no edema [No Cyanosis] : no cyanosis [No Clubbing] : no clubbing [No Varicosities] : no varicosities [No Rash] : no rash [No Skin Lesions] : no skin lesions [Moves all extremities] : moves all extremities [No Focal Deficits] : no focal deficits [Normal Speech] : normal speech [Alert and Oriented] : alert and oriented [Normal memory] : normal memory [de-identified] : +JOSSELIN with soft S2 [de-identified] : +cast over L. knee

## 2022-04-07 DIAGNOSIS — I35.0 NONRHEUMATIC AORTIC (VALVE) STENOSIS: ICD-10-CM

## 2022-04-20 ENCOUNTER — RESULT REVIEW (OUTPATIENT)
Age: 82
End: 2022-04-20

## 2022-04-22 ENCOUNTER — NON-APPOINTMENT (OUTPATIENT)
Age: 82
End: 2022-04-22

## 2022-04-22 ENCOUNTER — APPOINTMENT (OUTPATIENT)
Dept: CARDIOTHORACIC SURGERY | Facility: CLINIC | Age: 82
End: 2022-04-22
Payer: MEDICARE

## 2022-04-22 VITALS
SYSTOLIC BLOOD PRESSURE: 132 MMHG | HEART RATE: 73 BPM | HEIGHT: 62 IN | DIASTOLIC BLOOD PRESSURE: 78 MMHG | TEMPERATURE: 97.8 F | OXYGEN SATURATION: 98 % | BODY MASS INDEX: 36.8 KG/M2 | WEIGHT: 200 LBS

## 2022-04-22 PROCEDURE — 99203 OFFICE O/P NEW LOW 30 MIN: CPT

## 2022-04-22 RX ORDER — NYSTATIN AND TRIAMCINOLONE ACETONIDE 100000; 1 MG/G; MG/G
100000-0.1 CREAM TOPICAL TWICE DAILY
Qty: 1 | Refills: 0 | Status: DISCONTINUED | COMMUNITY
Start: 2022-04-11 | End: 2022-04-22

## 2022-04-22 RX ORDER — ANASTROZOLE TABLETS 1 MG/1
TABLET ORAL
Refills: 0 | Status: DISCONTINUED | COMMUNITY
End: 2022-04-22

## 2022-04-27 NOTE — ASSESSMENT
[FreeTextEntry1] : 83 y/o Female with PMHx of HTN, hx of breast cancer (s/p lumpectomy on anastrozle), chronic diastolic heart failure, severe AS (CARMELITA 1cm2, MG 34mmHg, PG 71.4mmHg seen on ECHO 2/18/22), CAD (nonobstructive seen on Cath on 2/2021) who presented to Dr. Mancera, structural heart disease, with NYHA IIIa symptoms, which are gradually worsening, and patient now referred to Dr. Allen, cardiothoracic surgery, for further evaluation of aortic stenosis. Given the abnormal echocardiogram findings and symptoms, Dr. Allen recommends intervening on the AV. Patient is high risk for SAVR given her age, comorbidities and frailty, and the patient agrees to proceed with TAVR. All questions answered. \par \par We spent _35_ minutes with the patient. \par \par Plan:\par 1) Recommend TAVR\par \par \par I, Kera Brownlee PA-C, am scribing for Dr. Arun Allen for the following sections: HISTORY OF PRESENT ILLNESS, PAST MEDICAL/FAMILY/SOCIAL HISTORY, REVIEW OF SYSTEMS, VITAL SIGNS, PHYSICAL EXAM AND DISPOSITION.

## 2022-04-27 NOTE — HISTORY OF PRESENT ILLNESS
[FreeTextEntry1] : 81 y/o Female with PMHx of HTN, hx of breast cancer (s/p lumpectomy on anastrozle), chronic diastolic heart failure, severe AS (CARMELITA 1cm2, MG 34mmHg, PG 71.4mmHg seen on ECHO 2/18/22), CAD (nonobstructive seen on Cath on 2/2021) who was referred for further evaluation of her valvular disease. \par \par The patient is complaining of worsening dyspnea with exertion, NYHA Class III.

## 2022-04-27 NOTE — PHYSICAL EXAM
[Well Developed] : well developed [Well Nourished] : well nourished [No Acute Distress] : no acute distress [Normal Conjunctiva] : normal conjunctiva [Normal Venous Pressure] : normal venous pressure [No Carotid Bruit] : no carotid bruit [No Rub] : no rub [No Gallop] : no gallop [Clear Lung Fields] : clear lung fields [Good Air Entry] : good air entry [No Respiratory Distress] : no respiratory distress  [Soft] : abdomen soft [Non Tender] : non-tender [No Masses/organomegaly] : no masses/organomegaly [Normal Bowel Sounds] : normal bowel sounds [Normal Gait] : normal gait [No Edema] : no edema [No Cyanosis] : no cyanosis [No Clubbing] : no clubbing [No Varicosities] : no varicosities [No Rash] : no rash [No Skin Lesions] : no skin lesions [Moves all extremities] : moves all extremities [No Focal Deficits] : no focal deficits [Normal Speech] : normal speech [Alert and Oriented] : alert and oriented [Normal memory] : normal memory [de-identified] : +JOSSELIN with soft S2 [de-identified] : +cast over L. knee

## 2022-04-29 ENCOUNTER — NON-APPOINTMENT (OUTPATIENT)
Age: 82
End: 2022-04-29

## 2022-04-30 ENCOUNTER — NON-APPOINTMENT (OUTPATIENT)
Age: 82
End: 2022-04-30

## 2022-05-16 ENCOUNTER — TRANSCRIPTION ENCOUNTER (OUTPATIENT)
Age: 82
End: 2022-05-16

## 2022-05-16 VITALS
SYSTOLIC BLOOD PRESSURE: 139 MMHG | DIASTOLIC BLOOD PRESSURE: 71 MMHG | HEIGHT: 62 IN | WEIGHT: 199.96 LBS | HEART RATE: 73 BPM | TEMPERATURE: 98 F | RESPIRATION RATE: 18 BRPM | OXYGEN SATURATION: 97 %

## 2022-05-16 NOTE — PATIENT PROFILE ADULT - FALL HARM RISK - RISK INTERVENTIONS

## 2022-05-17 ENCOUNTER — TRANSCRIPTION ENCOUNTER (OUTPATIENT)
Age: 82
End: 2022-05-17

## 2022-05-17 ENCOUNTER — APPOINTMENT (OUTPATIENT)
Dept: CARDIOTHORACIC SURGERY | Facility: CLINIC | Age: 82
End: 2022-05-17
Payer: MEDICARE

## 2022-05-17 ENCOUNTER — APPOINTMENT (OUTPATIENT)
Dept: CARDIOTHORACIC SURGERY | Facility: HOSPITAL | Age: 82
End: 2022-05-17

## 2022-05-17 ENCOUNTER — INPATIENT (INPATIENT)
Facility: HOSPITAL | Age: 82
LOS: 0 days | Discharge: HOME | DRG: 267 | End: 2022-05-18
Attending: INTERNAL MEDICINE | Admitting: INTERNAL MEDICINE
Payer: MEDICARE

## 2022-05-17 DIAGNOSIS — Z98.890 OTHER SPECIFIED POSTPROCEDURAL STATES: Chronic | ICD-10-CM

## 2022-05-17 DIAGNOSIS — Z87.81 PERSONAL HISTORY OF (HEALED) TRAUMATIC FRACTURE: Chronic | ICD-10-CM

## 2022-05-17 LAB
A1C WITH ESTIMATED AVERAGE GLUCOSE RESULT: 5.6 % — SIGNIFICANT CHANGE UP (ref 4–5.6)
ALBUMIN SERPL ELPH-MCNC: 3.7 G/DL — SIGNIFICANT CHANGE UP (ref 3.3–5)
ALBUMIN SERPL ELPH-MCNC: 4.5 G/DL — SIGNIFICANT CHANGE UP (ref 3.3–5)
ALP SERPL-CCNC: 70 U/L — SIGNIFICANT CHANGE UP (ref 40–120)
ALP SERPL-CCNC: 83 U/L — SIGNIFICANT CHANGE UP (ref 40–120)
ALT FLD-CCNC: 14 U/L — SIGNIFICANT CHANGE UP (ref 10–45)
ALT FLD-CCNC: 17 U/L — SIGNIFICANT CHANGE UP (ref 10–45)
ANION GAP SERPL CALC-SCNC: 12 MMOL/L — SIGNIFICANT CHANGE UP (ref 5–17)
ANION GAP SERPL CALC-SCNC: 9 MMOL/L — SIGNIFICANT CHANGE UP (ref 5–17)
APTT BLD: 27.3 SEC — LOW (ref 27.5–35.5)
APTT BLD: 28.6 SEC — SIGNIFICANT CHANGE UP (ref 27.5–35.5)
AST SERPL-CCNC: 20 U/L — SIGNIFICANT CHANGE UP (ref 10–40)
AST SERPL-CCNC: 22 U/L — SIGNIFICANT CHANGE UP (ref 10–40)
BASOPHILS # BLD AUTO: 0.03 K/UL — SIGNIFICANT CHANGE UP (ref 0–0.2)
BASOPHILS NFR BLD AUTO: 0.4 % — SIGNIFICANT CHANGE UP (ref 0–2)
BILIRUB SERPL-MCNC: 0.4 MG/DL — SIGNIFICANT CHANGE UP (ref 0.2–1.2)
BILIRUB SERPL-MCNC: 0.6 MG/DL — SIGNIFICANT CHANGE UP (ref 0.2–1.2)
BLD GP AB SCN SERPL QL: NEGATIVE — SIGNIFICANT CHANGE UP
BUN SERPL-MCNC: 19 MG/DL — SIGNIFICANT CHANGE UP (ref 7–23)
BUN SERPL-MCNC: 21 MG/DL — SIGNIFICANT CHANGE UP (ref 7–23)
CALCIUM SERPL-MCNC: 8.8 MG/DL — SIGNIFICANT CHANGE UP (ref 8.4–10.5)
CALCIUM SERPL-MCNC: 9.8 MG/DL — SIGNIFICANT CHANGE UP (ref 8.4–10.5)
CHLORIDE SERPL-SCNC: 109 MMOL/L — HIGH (ref 96–108)
CHLORIDE SERPL-SCNC: 109 MMOL/L — HIGH (ref 96–108)
CO2 SERPL-SCNC: 22 MMOL/L — SIGNIFICANT CHANGE UP (ref 22–31)
CO2 SERPL-SCNC: 24 MMOL/L — SIGNIFICANT CHANGE UP (ref 22–31)
CREAT SERPL-MCNC: 0.77 MG/DL — SIGNIFICANT CHANGE UP (ref 0.5–1.3)
CREAT SERPL-MCNC: 0.91 MG/DL — SIGNIFICANT CHANGE UP (ref 0.5–1.3)
EGFR: 63 ML/MIN/1.73M2 — SIGNIFICANT CHANGE UP
EGFR: 76 ML/MIN/1.73M2 — SIGNIFICANT CHANGE UP
EOSINOPHIL # BLD AUTO: 0.12 K/UL — SIGNIFICANT CHANGE UP (ref 0–0.5)
EOSINOPHIL NFR BLD AUTO: 1.5 % — SIGNIFICANT CHANGE UP (ref 0–6)
ESTIMATED AVERAGE GLUCOSE: 114 MG/DL — SIGNIFICANT CHANGE UP (ref 68–114)
GAS PNL BLDA: SIGNIFICANT CHANGE UP
GLUCOSE BLDC GLUCOMTR-MCNC: 165 MG/DL — HIGH (ref 70–99)
GLUCOSE BLDC GLUCOMTR-MCNC: 92 MG/DL — SIGNIFICANT CHANGE UP (ref 70–99)
GLUCOSE SERPL-MCNC: 111 MG/DL — HIGH (ref 70–99)
GLUCOSE SERPL-MCNC: 119 MG/DL — HIGH (ref 70–99)
HCT VFR BLD CALC: 35.4 % — SIGNIFICANT CHANGE UP (ref 34.5–45)
HCT VFR BLD CALC: 40.7 % — SIGNIFICANT CHANGE UP (ref 34.5–45)
HGB BLD-MCNC: 11.4 G/DL — LOW (ref 11.5–15.5)
HGB BLD-MCNC: 13.3 G/DL — SIGNIFICANT CHANGE UP (ref 11.5–15.5)
IMM GRANULOCYTES NFR BLD AUTO: 1.2 % — SIGNIFICANT CHANGE UP (ref 0–1.5)
INR BLD: 1.09 — SIGNIFICANT CHANGE UP (ref 0.88–1.16)
INR BLD: 1.17 — HIGH (ref 0.88–1.16)
ISTAT ARTERIAL BE: -1 MMOL/L — SIGNIFICANT CHANGE UP (ref -2–3)
ISTAT ARTERIAL GLUCOSE: 110 MG/DL — HIGH (ref 70–99)
ISTAT ARTERIAL HCO3: 25 MMOL/L — SIGNIFICANT CHANGE UP (ref 22–26)
ISTAT ARTERIAL HEMATOCRIT: 33 % — LOW (ref 34.5–45)
ISTAT ARTERIAL HEMOGLOBIN: 11.2 G/DL — LOW (ref 11.5–15.5)
ISTAT ARTERIAL IONIZED CALCIUM: 1.28 MMOL/L — SIGNIFICANT CHANGE UP (ref 1.12–1.3)
ISTAT ARTERIAL PCO2: 46 MMHG — HIGH (ref 35–45)
ISTAT ARTERIAL PH: 7.34 — LOW (ref 7.35–7.45)
ISTAT ARTERIAL PO2: 97 MMHG — SIGNIFICANT CHANGE UP (ref 80–105)
ISTAT ARTERIAL POTASSIUM: 3.9 MMOL/L — SIGNIFICANT CHANGE UP (ref 3.5–5.3)
ISTAT ARTERIAL SO2: 97 % — SIGNIFICANT CHANGE UP (ref 95–98)
ISTAT ARTERIAL SODIUM: 141 MMOL/L — SIGNIFICANT CHANGE UP (ref 135–145)
ISTAT ARTERIAL TCO2: 26 MMOL/L — SIGNIFICANT CHANGE UP (ref 22–31)
LYMPHOCYTES # BLD AUTO: 1.43 K/UL — SIGNIFICANT CHANGE UP (ref 1–3.3)
LYMPHOCYTES # BLD AUTO: 17.4 % — SIGNIFICANT CHANGE UP (ref 13–44)
MAGNESIUM SERPL-MCNC: 1.8 MG/DL — SIGNIFICANT CHANGE UP (ref 1.6–2.6)
MCHC RBC-ENTMCNC: 29.8 PG — SIGNIFICANT CHANGE UP (ref 27–34)
MCHC RBC-ENTMCNC: 30.4 PG — SIGNIFICANT CHANGE UP (ref 27–34)
MCHC RBC-ENTMCNC: 32.2 GM/DL — SIGNIFICANT CHANGE UP (ref 32–36)
MCHC RBC-ENTMCNC: 32.7 GM/DL — SIGNIFICANT CHANGE UP (ref 32–36)
MCV RBC AUTO: 92.7 FL — SIGNIFICANT CHANGE UP (ref 80–100)
MCV RBC AUTO: 93.1 FL — SIGNIFICANT CHANGE UP (ref 80–100)
MONOCYTES # BLD AUTO: 0.29 K/UL — SIGNIFICANT CHANGE UP (ref 0–0.9)
MONOCYTES NFR BLD AUTO: 3.5 % — SIGNIFICANT CHANGE UP (ref 2–14)
NEUTROPHILS # BLD AUTO: 6.23 K/UL — SIGNIFICANT CHANGE UP (ref 1.8–7.4)
NEUTROPHILS NFR BLD AUTO: 76 % — SIGNIFICANT CHANGE UP (ref 43–77)
NRBC # BLD: 0 /100 WBCS — SIGNIFICANT CHANGE UP (ref 0–0)
NRBC # BLD: 0 /100 WBCS — SIGNIFICANT CHANGE UP (ref 0–0)
NT-PROBNP SERPL-SCNC: 334 PG/ML — HIGH (ref 0–300)
PHOSPHATE SERPL-MCNC: 2.9 MG/DL — SIGNIFICANT CHANGE UP (ref 2.5–4.5)
PLATELET # BLD AUTO: 153 K/UL — SIGNIFICANT CHANGE UP (ref 150–400)
PLATELET # BLD AUTO: 186 K/UL — SIGNIFICANT CHANGE UP (ref 150–400)
POTASSIUM SERPL-MCNC: 4.1 MMOL/L — SIGNIFICANT CHANGE UP (ref 3.5–5.3)
POTASSIUM SERPL-MCNC: 4.1 MMOL/L — SIGNIFICANT CHANGE UP (ref 3.5–5.3)
POTASSIUM SERPL-SCNC: 4.1 MMOL/L — SIGNIFICANT CHANGE UP (ref 3.5–5.3)
POTASSIUM SERPL-SCNC: 4.1 MMOL/L — SIGNIFICANT CHANGE UP (ref 3.5–5.3)
PROT SERPL-MCNC: 5.9 G/DL — LOW (ref 6–8.3)
PROT SERPL-MCNC: 7.2 G/DL — SIGNIFICANT CHANGE UP (ref 6–8.3)
PROTHROM AB SERPL-ACNC: 13 SEC — SIGNIFICANT CHANGE UP (ref 10.5–13.4)
PROTHROM AB SERPL-ACNC: 13.9 SEC — HIGH (ref 10.5–13.4)
RBC # BLD: 3.82 M/UL — SIGNIFICANT CHANGE UP (ref 3.8–5.2)
RBC # BLD: 4.37 M/UL — SIGNIFICANT CHANGE UP (ref 3.8–5.2)
RBC # FLD: 12.3 % — SIGNIFICANT CHANGE UP (ref 10.3–14.5)
RBC # FLD: 12.4 % — SIGNIFICANT CHANGE UP (ref 10.3–14.5)
RH IG SCN BLD-IMP: POSITIVE — SIGNIFICANT CHANGE UP
SODIUM SERPL-SCNC: 140 MMOL/L — SIGNIFICANT CHANGE UP (ref 135–145)
SODIUM SERPL-SCNC: 145 MMOL/L — SIGNIFICANT CHANGE UP (ref 135–145)
TSH SERPL-MCNC: 1.89 UIU/ML — SIGNIFICANT CHANGE UP (ref 0.27–4.2)
WBC # BLD: 8.2 K/UL — SIGNIFICANT CHANGE UP (ref 3.8–10.5)
WBC # BLD: 8.67 K/UL — SIGNIFICANT CHANGE UP (ref 3.8–10.5)
WBC # FLD AUTO: 8.2 K/UL — SIGNIFICANT CHANGE UP (ref 3.8–10.5)
WBC # FLD AUTO: 8.67 K/UL — SIGNIFICANT CHANGE UP (ref 3.8–10.5)

## 2022-05-17 PROCEDURE — 33361 REPLACE AORTIC VALVE PERQ: CPT | Mod: 62,Q0

## 2022-05-17 PROCEDURE — 93010 ELECTROCARDIOGRAM REPORT: CPT

## 2022-05-17 PROCEDURE — MCOT1: CPT | Mod: NC

## 2022-05-17 PROCEDURE — 71045 X-RAY EXAM CHEST 1 VIEW: CPT | Mod: 26

## 2022-05-17 DEVICE — SHEATH INTRODUCER TERUMO PINNACLE CORONARY 5FR X 10CM X 0.038" MINI WIRE: Type: IMPLANTABLE DEVICE | Status: FUNCTIONAL

## 2022-05-17 DEVICE — PACING CATH PACEL RIGHT HEART CURVE 5FR KIT: Type: IMPLANTABLE DEVICE | Status: FUNCTIONAL

## 2022-05-17 DEVICE — WIRE GD CONFIDA BRECKER CRV .035X260: Type: IMPLANTABLE DEVICE | Status: FUNCTIONAL

## 2022-05-17 DEVICE — GWIRE .035 260CM 3MM TIP: Type: IMPLANTABLE DEVICE | Status: FUNCTIONAL

## 2022-05-17 DEVICE — SUT PERCLOSE PROGLIDE 6FR: Type: IMPLANTABLE DEVICE | Status: FUNCTIONAL

## 2022-05-17 DEVICE — GUIDEWIRE LUNDERQUIST EXTRA-STIFF DOUBLE CURVED .035" X 260CM: Type: IMPLANTABLE DEVICE | Status: FUNCTIONAL

## 2022-05-17 DEVICE — INTRO MICROPUNC 4FRX10CM SS: Type: IMPLANTABLE DEVICE | Status: FUNCTIONAL

## 2022-05-17 DEVICE — GWIRE ROSEN 0.035INX260CM: Type: IMPLANTABLE DEVICE | Status: FUNCTIONAL

## 2022-05-17 DEVICE — SHEATH INTRODUCER TERUMO PINNACLE CORONARY 8FR X 10CM X 0.038" MINI WIRE: Type: IMPLANTABLE DEVICE | Status: FUNCTIONAL

## 2022-05-17 DEVICE — GWIRE JTIP .035X150 STD: Type: IMPLANTABLE DEVICE | Status: FUNCTIONAL

## 2022-05-17 DEVICE — VLV HEART SAPIEN 3 ULTRA W/COMMANDER SYS 23MM: Type: IMPLANTABLE DEVICE | Status: FUNCTIONAL

## 2022-05-17 DEVICE — CATH DX PIG 145 INFIN 5FRX110CM: Type: IMPLANTABLE DEVICE | Status: FUNCTIONAL

## 2022-05-17 DEVICE — CATH OPT JR4.0  5FX100CM: Type: IMPLANTABLE DEVICE | Status: FUNCTIONAL

## 2022-05-17 DEVICE — ANGIOSEAL VASC CLOS VIP 8FR: Type: IMPLANTABLE DEVICE | Status: FUNCTIONAL

## 2022-05-17 DEVICE — GUIDEWIRE STANDARD STRAIGHT .035" X 180CM: Type: IMPLANTABLE DEVICE | Status: FUNCTIONAL

## 2022-05-17 DEVICE — GWIRE GUID  0.035INX150CM: Type: IMPLANTABLE DEVICE | Status: FUNCTIONAL

## 2022-05-17 RX ORDER — SODIUM CHLORIDE 9 MG/ML
1000 INJECTION, SOLUTION INTRAVENOUS
Refills: 0 | Status: DISCONTINUED | OUTPATIENT
Start: 2022-05-17 | End: 2022-05-18

## 2022-05-17 RX ORDER — ASPIRIN/CALCIUM CARB/MAGNESIUM 324 MG
162 TABLET ORAL ONCE
Refills: 0 | Status: COMPLETED | OUTPATIENT
Start: 2022-05-17 | End: 2022-05-17

## 2022-05-17 RX ORDER — GLUCAGON INJECTION, SOLUTION 0.5 MG/.1ML
1 INJECTION, SOLUTION SUBCUTANEOUS ONCE
Refills: 0 | Status: DISCONTINUED | OUTPATIENT
Start: 2022-05-17 | End: 2022-05-18

## 2022-05-17 RX ORDER — DEXTROSE 50 % IN WATER 50 %
25 SYRINGE (ML) INTRAVENOUS ONCE
Refills: 0 | Status: DISCONTINUED | OUTPATIENT
Start: 2022-05-17 | End: 2022-05-18

## 2022-05-17 RX ORDER — SODIUM CHLORIDE 9 MG/ML
1000 INJECTION INTRAMUSCULAR; INTRAVENOUS; SUBCUTANEOUS
Refills: 0 | Status: DISCONTINUED | OUTPATIENT
Start: 2022-05-17 | End: 2022-05-18

## 2022-05-17 RX ORDER — ACETAMINOPHEN 500 MG
650 TABLET ORAL EVERY 6 HOURS
Refills: 0 | Status: DISCONTINUED | OUTPATIENT
Start: 2022-05-17 | End: 2022-05-18

## 2022-05-17 RX ORDER — CEFAZOLIN SODIUM 1 G
2000 VIAL (EA) INJECTION EVERY 8 HOURS
Refills: 0 | Status: DISCONTINUED | OUTPATIENT
Start: 2022-05-17 | End: 2022-05-18

## 2022-05-17 RX ORDER — INSULIN LISPRO 100/ML
VIAL (ML) SUBCUTANEOUS EVERY 6 HOURS
Refills: 0 | Status: DISCONTINUED | OUTPATIENT
Start: 2022-05-17 | End: 2022-05-18

## 2022-05-17 RX ORDER — ANASTROZOLE 1 MG/1
1 TABLET ORAL DAILY
Refills: 0 | Status: DISCONTINUED | OUTPATIENT
Start: 2022-05-17 | End: 2022-05-18

## 2022-05-17 RX ORDER — PANTOPRAZOLE SODIUM 20 MG/1
40 TABLET, DELAYED RELEASE ORAL DAILY
Refills: 0 | Status: DISCONTINUED | OUTPATIENT
Start: 2022-05-17 | End: 2022-05-18

## 2022-05-17 RX ORDER — DEXTROSE 50 % IN WATER 50 %
12.5 SYRINGE (ML) INTRAVENOUS ONCE
Refills: 0 | Status: DISCONTINUED | OUTPATIENT
Start: 2022-05-17 | End: 2022-05-18

## 2022-05-17 RX ORDER — ASPIRIN/CALCIUM CARB/MAGNESIUM 324 MG
81 TABLET ORAL DAILY
Refills: 0 | Status: DISCONTINUED | OUTPATIENT
Start: 2022-05-17 | End: 2022-05-18

## 2022-05-17 RX ORDER — SENNA PLUS 8.6 MG/1
2 TABLET ORAL AT BEDTIME
Refills: 0 | Status: DISCONTINUED | OUTPATIENT
Start: 2022-05-17 | End: 2022-05-18

## 2022-05-17 RX ORDER — HEPARIN SODIUM 5000 [USP'U]/ML
5000 INJECTION INTRAVENOUS; SUBCUTANEOUS EVERY 8 HOURS
Refills: 0 | Status: DISCONTINUED | OUTPATIENT
Start: 2022-05-17 | End: 2022-05-18

## 2022-05-17 RX ORDER — ATORVASTATIN CALCIUM 80 MG/1
20 TABLET, FILM COATED ORAL AT BEDTIME
Refills: 0 | Status: DISCONTINUED | OUTPATIENT
Start: 2022-05-17 | End: 2022-05-18

## 2022-05-17 RX ORDER — CHLORHEXIDINE GLUCONATE 213 G/1000ML
1 SOLUTION TOPICAL DAILY
Refills: 0 | Status: DISCONTINUED | OUTPATIENT
Start: 2022-05-18 | End: 2022-05-18

## 2022-05-17 RX ORDER — DEXTROSE 50 % IN WATER 50 %
15 SYRINGE (ML) INTRAVENOUS ONCE
Refills: 0 | Status: DISCONTINUED | OUTPATIENT
Start: 2022-05-17 | End: 2022-05-18

## 2022-05-17 RX ADMIN — HEPARIN SODIUM 5000 UNIT(S): 5000 INJECTION INTRAVENOUS; SUBCUTANEOUS at 23:30

## 2022-05-17 RX ADMIN — PANTOPRAZOLE SODIUM 40 MILLIGRAM(S): 20 TABLET, DELAYED RELEASE ORAL at 23:29

## 2022-05-17 RX ADMIN — ATORVASTATIN CALCIUM 20 MILLIGRAM(S): 80 TABLET, FILM COATED ORAL at 23:29

## 2022-05-17 RX ADMIN — Medication 2: at 19:57

## 2022-05-17 RX ADMIN — Medication 162 MILLIGRAM(S): at 12:22

## 2022-05-17 RX ADMIN — Medication 100 MILLIGRAM(S): at 23:30

## 2022-05-17 NOTE — H&P ADULT - ASSESSMENT
This 82 year old female with PMH significant for HTN, breast cancer, chronic diastolic CHF, severe AS and CAD presented to this admission for surgical intervention for her aortic stenosis with associated symptoms of chest pain.  Patient underwent diagnostic cath with result of non obstructive CAD.  Echocardiogram reveals EH 65%, mild LVH with CARMELITA 1.1cm2, PG of 71mmHg and MG of 34mmHg.  Patient denies malaise, fatigues, syncopes, palpitation, or LE edema.  She is thought to be a candidate for surgical invention.      Neurovascular: No delirium.     Cardiovascular:   aortic stenosis   - continue ASA 81mg   - pre op EKG   - pre op lab   - surgical intervention this admission     Respiratory: 02 Sat = 98% on RA.  -If on oxygen wean to RA from for O2 Sat > 93%.  -Encourage C+DB and Use of IS 10x / hr while awake.  -CXR.    GI: Stable.  -PPX.  -PO Diet post     Renal / : stable   - gentle hydration post procedure  - i&o    Endocrine: stable  - follow up with labs    Hematologic: h/h stable   - type and screen and hold blood product  - daily labs    ID: afebrile   - stable  - continue to monitor for now    Prophylaxis:  -DVT prophylaxis with 5000 SubQ Heparin q8h.  -SCD's    Disposition:  - TAVR intervention at this admission

## 2022-05-17 NOTE — H&P ADULT - NSHPLABSRESULTS_GEN_ALL_CORE
LABS:                        13.3   8.67  )-----------( 186      ( 17 May 2022 10:45 )             40.7       COUMADIN:  Yes/No. REASON: .    PT/INR - ( 17 May 2022 10:45 )   PT: 13.0 sec;   INR: 1.09     PTT - ( 17 May 2022 10:45 )  PTT:27.3 sec    05-17    145  |  109<H>  |  21  ----------------------------<  111<H>  4.1   |  24  |  0.91    Ca    9.8      17 May 2022 10:45  Phos  2.9     05-17  Mg     1.8     05-17    TPro  7.2  /  Alb  4.5  /  TBili  0.6  /  DBili  x   /  AST  22  /  ALT  17  /  AlkPhos  83  05-17          MEDICATIONS  (STANDING):    MEDICATIONS  (PRN):        RADIOLOGY & ADDITIONAL TESTS: LABS:                        13.3   8.67  )-----------( 186      ( 17 May 2022 10:45 )             40.7       COUMADIN: no    PT/INR - ( 17 May 2022 10:45 )   PT: 13.0 sec;   INR: 1.09     PTT - ( 17 May 2022 10:45 )  PTT:27.3 sec    05-17    145  |  109<H>  |  21  ----------------------------<  111<H>  4.1   |  24  |  0.91    Ca    9.8      17 May 2022 10:45  Phos  2.9     05-17  Mg     1.8     05-17    TPro  7.2  /  Alb  4.5  /  TBili  0.6  /  DBili  x   /  AST  22  /  ALT  17  /  AlkPhos  83  05-17    RADIOLOGY & ADDITIONAL TESTS:  CT  ECHO   CXR  result in chart

## 2022-05-17 NOTE — H&P ADULT - NSHPSOCIALHISTORY_GEN_ALL_CORE
Smoker:  no  ETOH use:  no  Ilicit Drug use: no  Occupation: non contributory   Assisted device use (Cane / Walker): cane and walker  Live with: family

## 2022-05-17 NOTE — BRIEF OPERATIVE NOTE - COMMENTS
Dr. Romero was the first assistant for this case including but not limited to TAVR     I was present for this procedure and participated as first assistant as described by the PA above, unless otherwise noted below.

## 2022-05-17 NOTE — H&P ADULT - HISTORY OF PRESENT ILLNESS
Surgeon:    Requesting Physician:    HISTORY OF PRESENT ILLNESS:  82y Female    PAST MEDICAL & SURGICAL HISTORY:  HTN (hypertension)      Breast cancer      Obesity      H/O breast biopsy      History of ankle fracture          MEDICATIONS  (STANDING):    MEDICATIONS  (PRN):      Allergies    No Known Allergies    Intolerances        SOCIAL HISTORY:  Smoker:  YES / NO        PACK YEARS:                         WHEN QUIT?  ETOH use:  YES / NO               FREQUENCY / QUANTITY:  Ilicit Drug use:  YES / NO  Occupation:  Assisted device use (Cane / Walker):  Live with:    FAMILY HISTORY:      Review of Systems  CONSTITUTIONAL:  Fevers / chills, sweats, fatigue, weight loss, weight gain                                    NEGATIVE  NEURO:  parathesias, seizures, syncope, confusion                                                                               NEGATIVE  EYES:  Blurry vision, discharge, pain, loss of vision                                                                                  NEGATIVE  ENMT:  Difficulty hearing, vertigo, dysphagia, epistaxis, recent dental work                                     NEGATIVE  CV:  Chest pain, palpitations, DEXTER, orthopnea                                                                                         NEGATIVE  RESPIRATORY:  Wheezing, SOB, cough / sputum, hemoptysis                                                              NEGATIVE  GI:  Nausea, vommiting, diarrhea, constipation, melena                                                                        NEGATIVE  : Hematuria, dysuria, urgency, incontinence                                                                                       NEGATIVE  MUSKULOSKELETAL:  arthritis, joint swelling, muscle weakness                                                           NEGATIVE  SKIN/BREAST:  rash, itching, mary alice loss, masses                                                                                            NEGATIVE  PSYCH:  depresion, anxiety, suicidal ideation                                                                                             NEGATIVE  HEME/LYMPH:  bruises easily, enlarged lymph nodes, tender lymph nodes                                        NEGATIVE  ENDOCRINE:  cold intolerance, heat intolerance, polydipsia                                                                   NEGATIVE    PHYSICAL EXAM  Vital Signs Last 24 Hrs  T(C): --  T(F): --  HR: --  BP: --  BP(mean): --  RR: --  SpO2: --    CONSTITUTIONAL:                                                                          WNL  NEURO:                                                                                             WNL                      EYES:                                                                                                  WNL  ENMT:                                                                                                WNL  CV:                                                                                                      WNL  RESPIRATORY:                                                                                  WNL  GI:                                                                                                       WNL  : ROGERS + / -                                                                                 WNL  MUSKULOSKELETAL:                                                                       WNL  SKIN / BREAST:                                                                                 WNL                                                           This 82 year old female with PMH significant for HTN, breast cancer, chronic diastolic CHF, severe AS and CAD presented to this admission for surgical intervention for her aortic stenosis with associated symptoms of chest pain.  Patient underwent diagnostic cath with result of non obstructive CAD.  Echocardiogram reveals EH 65%, mild LVH with CARMELITA 1.1cm2, PG of 71mmHg and MG of 34mmHg.  Patient denies malaise, fatigues, syncopes, palpitation, or LE edema.  She is thought to be a candidate for surgical invention.      Upon arrival, she is accompanied with her son and daughter in law.  Patient denies chest pain, shortness of breath or syncopes.  Hemodynamically stable and afebrile.

## 2022-05-17 NOTE — H&P ADULT - NSHPREVIEWOFSYSTEMS_GEN_ALL_CORE
CONSTITUTIONAL:  Fevers / chills, sweats, fatigue, weight loss, weight gain                                      NEURO:  parathesias, seizures, syncope, confusion                                                                                 EYES:  Blurry vision, discharge, pain, loss of vision                                                                            ENMT:  Difficulty hearing, vertigo, dysphagia, epistaxis, recent dental work                                      CV:  Chest pain, palpitations, DEXTER, orthopnea                                                                                     RESPIRATORY:  Wheezing, SOB, cough / sputum, hemoptysis                                                               GI:  Nausea, vommiting, diarrhea, constipation, melena                                                                         : Hematuria, dysuria, urgency, incontinence                                                                                         MUSKULOSKELETAL:  arthritis, joint swelling, muscle weakness                                                             SKIN/BREAST:  rash, itching, mary alice loss, masses                                                                                             PSYCH:  depresion, anxiety, suicidal ideation                                                                                              HEME/LYMPH:  bruises easily, enlarged lymph nodes, tender lymph nodes                                          ENDOCRINE:  cold intolerance, heat intolerance, polydipsia

## 2022-05-17 NOTE — H&P ADULT - NSHPPHYSICALEXAM_GEN_ALL_CORE
T(C): --  T(F): --  HR: --  BP: --  BP(mean): --  RR: --  SpO2: --    CONSTITUTIONAL:                                                                       NEURO:                                                                                                              EYES:                                                                                                   ENMT:                                                                                                CV:                                                                                                        RESPIRATORY:                                                                               GI:                                                                                                       : ROGERS + / -                                                                                   MUSKULOSKELETAL:                                                                        SKIN / BREAST: T(C): --  T(F): 97F  HR: 60  BP: 130/66  BP(mean): --  RR: 16  SpO2: 99%    CONSTITUTIONAL:      NAD                                                                 NEURO:   grossly intact                                                                                                            EYES:   PERRL                                                                                                ENMT:       supple and non lymphadenopathy                                                                                          CV:           RRR with positive for murmur                                                                                              RESPIRATORY:     no wheezing and non rhonchi                                                                         GI:       BM and BS are intact                                                                                                 : deferred                                                                                   MUSKULOSKELETAL:   no deformity                                                                    SKIN / BREAST:  warm. no ulceration

## 2022-05-17 NOTE — PROGRESS NOTE ADULT - ASSESSMENT
83 y/o F with PMH significant for HTN, breast cancer, chronic diastolic CHF, severe AS and CAD presented to this admission for surgical intervention for her aortic stenosis with associated symptoms of chest pain.  Patient underwent diagnostic cath with result of non obstructive CAD.  Echocardiogram reveals EH 65%, mild LVH with CARMELITA 1.1cm2, PG of 71mmHg and MG of 34mmHg. On 5/17/22 pt underwent TAVR Ascencion valve. No rhythm changes. Arrived to 9L under ICU level care.     Plan:    Neurovascular:   -acetaminophen as needed     Cardiovascular:   -POD0 from TAVR Ascencion valve    -asa monotherapy     -ECHO in AM     -radial band to be removed at 7:30 tonight     -no rhythm changes   -Hemodynamically stable.   -Monitor: BP, HR, tele    Respiratory:   -Oxygenating well on room air  -Encourage continued use of IS 10x/hr and frequent ambulation  -CXR: no acute pathology no PTX     GI:  -GI PPX:  -PO Diet  -Bowel Regimen:     Renal / :  -Continue to monitor renal function: BUN/Cr  -Monitor I/O's daily     Endocrine:    -No hx of DM or thyroid dx  -A1c:  -TSH:    Hematologic:  -CBC: H/H-  -Coagulation Panel.    ID:  -Temperature:   -CBC: WBC-  -Continue to observe for SIRS/Sepsis Syndrome.    Prophylaxis:  -DVT prophylaxis with 5000 SubQ Heparin q8h.  -Continue with SCD's b/l while patient is at rest     Disposition:  -Discharge home once patient is medically ready   81 y/o F with PMH significant for HTN, breast cancer, chronic diastolic CHF, severe AS and CAD presented to this admission for surgical intervention for her aortic stenosis with associated symptoms of chest pain.  Patient underwent diagnostic cath with result of non obstructive CAD.  Echocardiogram reveals EH 65%, mild LVH with CARMELITA 1.1cm2, PG of 71mmHg and MG of 34mmHg. On 5/17/22 pt underwent TAVR Ascencion valve. No rhythm changes. Arrived to 9L under ICU level care.     Plan:  Neurovascular:   -acetaminophen as needed     Cardiovascular:   -POD0 from TAVR Ascencion valve    -asa monotherapy     -ECHO in AM     -radial band to be removed at 7:30 tonight     -no rhythm changes  -HLD: continue with atorvastatin    -Hemodynamically stable.   -Monitor: BP, HR, tele    Respiratory:   -Oxygenating well on room air  -Encourage continued use of IS 10x/hr and frequent ambulation  -CXR: no acute pathology no PTX     GI:  -GI PPX: pantoprazole 40mg daily   -PO Diet: clear liquid   -Bowel Regimen: senna     Renal / :  -Continue to monitor renal function: BUN/Cr 19/0.77  -Monitor I/O's daily     Endocrine:    -No hx of DM or thyroid dx  -A1c: pending    -TSH: pending     Hematologic:   -CBC: H/H- 11/35  -Coagulation Panel: WNL     ID:  -Temperature: afebrile   -CBC: WBC- 8.2   -Continue to observe for SIRS/Sepsis Syndrome.    Prophylaxis:  -DVT prophylaxis with 5000 SubQ Heparin q8h.  -Continue with SCD's b/l while patient is at rest     Disposition:  -continue with ICU level care

## 2022-05-18 ENCOUNTER — TRANSCRIPTION ENCOUNTER (OUTPATIENT)
Age: 82
End: 2022-05-18

## 2022-05-18 VITALS
SYSTOLIC BLOOD PRESSURE: 145 MMHG | OXYGEN SATURATION: 95 % | DIASTOLIC BLOOD PRESSURE: 63 MMHG | RESPIRATION RATE: 16 BRPM | HEART RATE: 90 BPM

## 2022-05-18 PROBLEM — E66.9 OBESITY, UNSPECIFIED: Chronic | Status: ACTIVE | Noted: 2022-05-16

## 2022-05-18 PROBLEM — I10 ESSENTIAL (PRIMARY) HYPERTENSION: Chronic | Status: ACTIVE | Noted: 2022-05-16

## 2022-05-18 PROBLEM — C50.919 MALIGNANT NEOPLASM OF UNSPECIFIED SITE OF UNSPECIFIED FEMALE BREAST: Chronic | Status: ACTIVE | Noted: 2022-05-16

## 2022-05-18 LAB
ALBUMIN SERPL ELPH-MCNC: 3.5 G/DL — SIGNIFICANT CHANGE UP (ref 3.3–5)
ALP SERPL-CCNC: 65 U/L — SIGNIFICANT CHANGE UP (ref 40–120)
ALT FLD-CCNC: 13 U/L — SIGNIFICANT CHANGE UP (ref 10–45)
ANION GAP SERPL CALC-SCNC: 9 MMOL/L — SIGNIFICANT CHANGE UP (ref 5–17)
APTT BLD: 25 SEC — LOW (ref 27.5–35.5)
AST SERPL-CCNC: 24 U/L — SIGNIFICANT CHANGE UP (ref 10–40)
BILIRUB SERPL-MCNC: 0.6 MG/DL — SIGNIFICANT CHANGE UP (ref 0.2–1.2)
BUN SERPL-MCNC: 18 MG/DL — SIGNIFICANT CHANGE UP (ref 7–23)
CALCIUM SERPL-MCNC: 8.8 MG/DL — SIGNIFICANT CHANGE UP (ref 8.4–10.5)
CHLORIDE SERPL-SCNC: 108 MMOL/L — SIGNIFICANT CHANGE UP (ref 96–108)
CO2 SERPL-SCNC: 22 MMOL/L — SIGNIFICANT CHANGE UP (ref 22–31)
CREAT SERPL-MCNC: 0.79 MG/DL — SIGNIFICANT CHANGE UP (ref 0.5–1.3)
EGFR: 74 ML/MIN/1.73M2 — SIGNIFICANT CHANGE UP
GLUCOSE BLDC GLUCOMTR-MCNC: 106 MG/DL — HIGH (ref 70–99)
GLUCOSE SERPL-MCNC: 106 MG/DL — HIGH (ref 70–99)
HCT VFR BLD CALC: 33 % — LOW (ref 34.5–45)
HGB BLD-MCNC: 11 G/DL — LOW (ref 11.5–15.5)
INR BLD: 1.19 — HIGH (ref 0.88–1.16)
MAGNESIUM SERPL-MCNC: 1.6 MG/DL — SIGNIFICANT CHANGE UP (ref 1.6–2.6)
MCHC RBC-ENTMCNC: 31.5 PG — SIGNIFICANT CHANGE UP (ref 27–34)
MCHC RBC-ENTMCNC: 33.3 GM/DL — SIGNIFICANT CHANGE UP (ref 32–36)
MCV RBC AUTO: 94.6 FL — SIGNIFICANT CHANGE UP (ref 80–100)
NRBC # BLD: 0 /100 WBCS — SIGNIFICANT CHANGE UP (ref 0–0)
PHOSPHATE SERPL-MCNC: 3 MG/DL — SIGNIFICANT CHANGE UP (ref 2.5–4.5)
PLATELET # BLD AUTO: 145 K/UL — LOW (ref 150–400)
POTASSIUM SERPL-MCNC: 3.7 MMOL/L — SIGNIFICANT CHANGE UP (ref 3.5–5.3)
POTASSIUM SERPL-SCNC: 3.7 MMOL/L — SIGNIFICANT CHANGE UP (ref 3.5–5.3)
PROT SERPL-MCNC: 5.7 G/DL — LOW (ref 6–8.3)
PROTHROM AB SERPL-ACNC: 14.2 SEC — HIGH (ref 10.5–13.4)
RBC # BLD: 3.49 M/UL — LOW (ref 3.8–5.2)
RBC # FLD: 12.4 % — SIGNIFICANT CHANGE UP (ref 10.3–14.5)
SODIUM SERPL-SCNC: 139 MMOL/L — SIGNIFICANT CHANGE UP (ref 135–145)
WBC # BLD: 9.55 K/UL — SIGNIFICANT CHANGE UP (ref 3.8–10.5)
WBC # FLD AUTO: 9.55 K/UL — SIGNIFICANT CHANGE UP (ref 3.8–10.5)

## 2022-05-18 PROCEDURE — 83036 HEMOGLOBIN GLYCOSYLATED A1C: CPT

## 2022-05-18 PROCEDURE — C1760: CPT

## 2022-05-18 PROCEDURE — 84132 ASSAY OF SERUM POTASSIUM: CPT

## 2022-05-18 PROCEDURE — 86901 BLOOD TYPING SEROLOGIC RH(D): CPT

## 2022-05-18 PROCEDURE — 80053 COMPREHEN METABOLIC PANEL: CPT

## 2022-05-18 PROCEDURE — 83735 ASSAY OF MAGNESIUM: CPT

## 2022-05-18 PROCEDURE — 86923 COMPATIBILITY TEST ELECTRIC: CPT

## 2022-05-18 PROCEDURE — 85610 PROTHROMBIN TIME: CPT

## 2022-05-18 PROCEDURE — 86900 BLOOD TYPING SEROLOGIC ABO: CPT

## 2022-05-18 PROCEDURE — 83880 ASSAY OF NATRIURETIC PEPTIDE: CPT

## 2022-05-18 PROCEDURE — 93010 ELECTROCARDIOGRAM REPORT: CPT

## 2022-05-18 PROCEDURE — 71045 X-RAY EXAM CHEST 1 VIEW: CPT | Mod: 26

## 2022-05-18 PROCEDURE — 82962 GLUCOSE BLOOD TEST: CPT

## 2022-05-18 PROCEDURE — 36415 COLL VENOUS BLD VENIPUNCTURE: CPT

## 2022-05-18 PROCEDURE — 93005 ELECTROCARDIOGRAM TRACING: CPT

## 2022-05-18 PROCEDURE — L8699: CPT

## 2022-05-18 PROCEDURE — 93321 DOPPLER ECHO F-UP/LMTD STD: CPT

## 2022-05-18 PROCEDURE — 71045 X-RAY EXAM CHEST 1 VIEW: CPT

## 2022-05-18 PROCEDURE — 97161 PT EVAL LOW COMPLEX 20 MIN: CPT

## 2022-05-18 PROCEDURE — C1769: CPT

## 2022-05-18 PROCEDURE — 82330 ASSAY OF CALCIUM: CPT

## 2022-05-18 PROCEDURE — 85025 COMPLETE CBC W/AUTO DIFF WBC: CPT

## 2022-05-18 PROCEDURE — 84295 ASSAY OF SERUM SODIUM: CPT

## 2022-05-18 PROCEDURE — C1889: CPT

## 2022-05-18 PROCEDURE — 82803 BLOOD GASES ANY COMBINATION: CPT

## 2022-05-18 PROCEDURE — 93306 TTE W/DOPPLER COMPLETE: CPT | Mod: 26

## 2022-05-18 PROCEDURE — 93306 TTE W/DOPPLER COMPLETE: CPT

## 2022-05-18 PROCEDURE — 93308 TTE F-UP OR LMTD: CPT | Mod: 26

## 2022-05-18 PROCEDURE — 85027 COMPLETE CBC AUTOMATED: CPT

## 2022-05-18 PROCEDURE — 84100 ASSAY OF PHOSPHORUS: CPT

## 2022-05-18 PROCEDURE — C1894: CPT

## 2022-05-18 PROCEDURE — 86850 RBC ANTIBODY SCREEN: CPT

## 2022-05-18 PROCEDURE — C1887: CPT

## 2022-05-18 PROCEDURE — 85730 THROMBOPLASTIN TIME PARTIAL: CPT

## 2022-05-18 PROCEDURE — 93010 ELECTROCARDIOGRAM REPORT: CPT | Mod: 77

## 2022-05-18 PROCEDURE — 99231 SBSQ HOSP IP/OBS SF/LOW 25: CPT

## 2022-05-18 PROCEDURE — 84443 ASSAY THYROID STIM HORMONE: CPT

## 2022-05-18 RX ORDER — ANASTROZOLE 1 MG/1
1 TABLET ORAL
Qty: 30 | Refills: 0
Start: 2022-05-18 | End: 2022-06-16

## 2022-05-18 RX ORDER — ONDANSETRON 8 MG/1
4 TABLET, FILM COATED ORAL ONCE
Refills: 0 | Status: COMPLETED | OUTPATIENT
Start: 2022-05-18 | End: 2022-05-18

## 2022-05-18 RX ORDER — ASPIRIN/CALCIUM CARB/MAGNESIUM 324 MG
1 TABLET ORAL
Qty: 30 | Refills: 0
Start: 2022-05-18 | End: 2022-06-16

## 2022-05-18 RX ORDER — MAGNESIUM OXIDE 400 MG ORAL TABLET 241.3 MG
400 TABLET ORAL ONCE
Refills: 0 | Status: COMPLETED | OUTPATIENT
Start: 2022-05-18 | End: 2022-05-18

## 2022-05-18 RX ORDER — ATORVASTATIN CALCIUM 80 MG/1
1 TABLET, FILM COATED ORAL
Qty: 30 | Refills: 0
Start: 2022-05-18 | End: 2022-06-16

## 2022-05-18 RX ORDER — MAGNESIUM OXIDE 400 MG ORAL TABLET 241.3 MG
800 TABLET ORAL ONCE
Refills: 0 | Status: DISCONTINUED | OUTPATIENT
Start: 2022-05-18 | End: 2022-05-18

## 2022-05-18 RX ORDER — PANTOPRAZOLE SODIUM 20 MG/1
1 TABLET, DELAYED RELEASE ORAL
Qty: 30 | Refills: 0
Start: 2022-05-18 | End: 2022-06-16

## 2022-05-18 RX ORDER — LOSARTAN POTASSIUM 100 MG/1
1 TABLET, FILM COATED ORAL
Qty: 0 | Refills: 0 | DISCHARGE

## 2022-05-18 RX ORDER — POTASSIUM CHLORIDE 20 MEQ
40 PACKET (EA) ORAL ONCE
Refills: 0 | Status: DISCONTINUED | OUTPATIENT
Start: 2022-05-18 | End: 2022-05-18

## 2022-05-18 RX ORDER — SENNA PLUS 8.6 MG/1
2 TABLET ORAL
Qty: 60 | Refills: 0
Start: 2022-05-18 | End: 2022-06-16

## 2022-05-18 RX ORDER — ASPIRIN/CALCIUM CARB/MAGNESIUM 324 MG
1 TABLET ORAL
Qty: 0 | Refills: 0 | DISCHARGE

## 2022-05-18 RX ORDER — ATORVASTATIN CALCIUM 80 MG/1
1 TABLET, FILM COATED ORAL
Qty: 0 | Refills: 0 | DISCHARGE

## 2022-05-18 RX ORDER — ANASTROZOLE 1 MG/1
1 TABLET ORAL
Qty: 0 | Refills: 0 | DISCHARGE

## 2022-05-18 RX ORDER — POTASSIUM CHLORIDE 20 MEQ
20 PACKET (EA) ORAL ONCE
Refills: 0 | Status: COMPLETED | OUTPATIENT
Start: 2022-05-18 | End: 2022-05-18

## 2022-05-18 RX ORDER — ACETAMINOPHEN 500 MG
2 TABLET ORAL
Qty: 240 | Refills: 0
Start: 2022-05-18 | End: 2022-06-16

## 2022-05-18 RX ADMIN — Medication 20 MILLIEQUIVALENT(S): at 05:41

## 2022-05-18 RX ADMIN — PANTOPRAZOLE SODIUM 40 MILLIGRAM(S): 20 TABLET, DELAYED RELEASE ORAL at 11:28

## 2022-05-18 RX ADMIN — HEPARIN SODIUM 5000 UNIT(S): 5000 INJECTION INTRAVENOUS; SUBCUTANEOUS at 13:57

## 2022-05-18 RX ADMIN — Medication 100 MILLIGRAM(S): at 07:06

## 2022-05-18 RX ADMIN — HEPARIN SODIUM 5000 UNIT(S): 5000 INJECTION INTRAVENOUS; SUBCUTANEOUS at 05:19

## 2022-05-18 RX ADMIN — MAGNESIUM OXIDE 400 MG ORAL TABLET 400 MILLIGRAM(S): 241.3 TABLET ORAL at 05:41

## 2022-05-18 RX ADMIN — Medication 100 MILLIGRAM(S): at 13:56

## 2022-05-18 RX ADMIN — CHLORHEXIDINE GLUCONATE 1 APPLICATION(S): 213 SOLUTION TOPICAL at 11:29

## 2022-05-18 RX ADMIN — ANASTROZOLE 1 MILLIGRAM(S): 1 TABLET ORAL at 11:28

## 2022-05-18 RX ADMIN — Medication 81 MILLIGRAM(S): at 11:28

## 2022-05-18 RX ADMIN — ONDANSETRON 4 MILLIGRAM(S): 8 TABLET, FILM COATED ORAL at 05:41

## 2022-05-18 NOTE — DISCHARGE NOTE PROVIDER - CARE PROVIDERS DIRECT ADDRESSES
,[email or web address] [email or web address]

## 2022-05-18 NOTE — DISCHARGE NOTE PROVIDER - NSDCMRMEDTOKEN_GEN_ALL_CORE_FT
acetaminophen 325 mg oral tablet: 2 tab(s) orally every 6 hours, As needed, Temp greater or equal to 38C (100.4F), Mild Pain (1 - 3)  anastrozole 1 mg oral tablet: 1 tab(s) orally once a day  aspirin 81 mg oral delayed release tablet: 1 tab(s) orally once a day  atorvastatin 20 mg oral tablet: 1 tab(s) orally once a day (at bedtime)   pantoprazole 40 mg oral delayed release tablet: 1 tab(s) orally once a day   senna oral tablet: 2 tab(s) orally once a day (at bedtime)

## 2022-05-18 NOTE — PHYSICAL THERAPY INITIAL EVALUATION ADULT - ADDITIONAL COMMENTS
Patient lives with family in a house with 1 step to enter. Independent PTA. Denies use of AD but owns a cane and RW

## 2022-05-18 NOTE — DISCHARGE NOTE PROVIDER - HOSPITAL COURSE
83 y/o F with PMH significant for HTN, breast cancer, chronic diastolic CHF, severe AS and CAD presented to this admission for surgical intervention for her aortic stenosis with associated symptoms of chest pain.  Patient underwent diagnostic cath with result of non obstructive CAD.  Echocardiogram reveals EH 65%, mild LVH with CARMELITA 1.1cm2, PG of 71mmHg and MG of 34mmHg. On 5/17/22 pt underwent TAVR Ascencion valve. No rhythm changes. Arrived to 9L under ICU level care. POD 1 ECHO performed which showed....    Over 35 minutes was spent with the patient reviewing the discharge material including medications, follow up appointments, recovery, concerning symptoms, and how to contact their health care providers if they have questions 83 y/o F with PMH significant for HTN, breast cancer, chronic diastolic CHF, severe AS and CAD presented to this admission for surgical intervention for her aortic stenosis with associated symptoms of chest pain.  Patient underwent diagnostic cath with result of non obstructive CAD.  Echocardiogram reveals EH 65%, mild LVH with CARMELITA 1.1cm2, PG of 71mmHg and MG of 34mmHg. On 5/17/22 pt underwent TAVR Ascencion valve. No rhythm changes. Arrived to 9L under ICU level care. POD 1 ECHO performed which showed < from: Echocardiogram w/ Bubble and Doppler (05.18.22 @ 13:21) >     1. Normal left ventricular size and systolic function.   2. Mild symmetric left ventricular hypertrophy.   3. Normal right ventricular size and systolic function.   4. Normal atria.   5. 23 mm Ascencion 3 Ultra valve is noted in the aortic position, with   normal function. There is no evidence of aortic regurgitation. The peak   transvalvular velocity is 3.60 m/s, the mean transvalvular gradient is   24.00 mmHg, and the LVOT/AV velocity ratio is 0.47.   6. No evidence of pulmonary hypertension.   7. No pericardial effusion.   8. No prior echo is available for comparison.    < end of copied text >    Patient ambulating independently with room air, tolerating diet, pain controlled, urinating and having bowel movements.  Per Dr. Mancrea, Patient stable for discharge home.     Over 35 minutes was spent with the patient reviewing the discharge material including medications, follow up appointments, recovery, concerning symptoms, and how to contact their health care providers if they have questions

## 2022-05-18 NOTE — PROGRESS NOTE ADULT - ASSESSMENT
83 y/o F with PMH significant for HTN, breast cancer, chronic diastolic CHF, severe AS and CAD presented to this admission for surgical intervention for her aortic stenosis with associated symptoms of chest pain.  Patient underwent diagnostic cath with result of non obstructive CAD.  Echocardiogram reveals EH 65%, mild LVH with CARMELITA 1.1cm2, PG of 71mmHg and MG of 34mmHg. On 5/17/22 pt underwent TAVR Ascencion valve. No rhythm changes. Arrived to 9L under ICU level care. POD 1 ECHO performed which showed....

## 2022-05-18 NOTE — PROGRESS NOTE ADULT - SUBJECTIVE AND OBJECTIVE BOX
Patient discussed on morning rounds with Dr. Lyon and Dr. Allen     Operation / Date: TAVR Ascencion, EF normal     SUBJECTIVE ASSESSMENT:  Pt is feeling well, no complaints. Feels sleepy post-procedure. Denies any CP, palpitations, SOB wheezing, abd pain, n/v/d/c, fevers or hcills.     Vital Signs Last 24 Hrs  T(C): 36.2 (17 May 2022 17:13), Max: 36.2 (17 May 2022 17:13)  T(F): 97.2 (17 May 2022 17:13), Max: 97.2 (17 May 2022 17:13)  HR: --  BP: --  BP(mean): --  RR: --  SpO2: --  I&O's Detail    CHEST TUBE: no.   GIBSON DRAIN:  no  EPICARDIAL WIRES: no  TIE DOWNS: no  ROGERS: no.    PHYSICAL EXAM:  General: well appearing resting supine in NAD   Neurological: AOx3. Motor skills grossly intact  Cardiovascular: Normal S1/S2. Regular rate/rhythm. No murmurs  Respiratory: Lungs CTA bilaterally. No wheezing or rales  Gastrointestinal: +BS in all 4 quadrants. Non-distended. Soft. Non-tender  Extremities: Strength 5/5 b/l upper/lower extremities. Sensation grossly intact upper/lower extremities. No edema. No calf tenderness.  Vascular: Radial 2+bilaterally, DP 2+ b/l  Incision Sites: b/l groin incisions without erythema, purulence or ecchymosis.     LABS:                        11.4   8.20  )-----------( 153      ( 17 May 2022 16:55 )             35.4     COUMADIN:  no    PT/INR - ( 17 May 2022 16:55 )   PT: 13.9 sec;   INR: 1.17     PTT - ( 17 May 2022 16:55 )  PTT:28.6 sec    05-17    140  |  109<H>  |  19  ----------------------------<  119<H>  4.1   |  22  |  0.77    Ca    8.8      17 May 2022 16:55  Phos  2.9     05-17  Mg     1.8     05-17    TPro  5.9<L>  /  Alb  3.7  /  TBili  0.4  /  DBili  x   /  AST  20  /  ALT  14  /  AlkPhos  70  05-17    MEDICATIONS  (STANDING):  anastrozole 1 milliGRAM(s) Oral daily  aspirin enteric coated 81 milliGRAM(s) Oral daily  atorvastatin 20 milliGRAM(s) Oral at bedtime  ceFAZolin   IVPB 2000 milliGRAM(s) IV Intermittent every 8 hours  glucagon  Injectable 1 milliGRAM(s) IntraMuscular once  heparin   Injectable 5000 Unit(s) SubCutaneous every 8 hours  insulin lispro (ADMELOG) corrective regimen sliding scale   SubCutaneous every 6 hours  pantoprazole  Injectable 40 milliGRAM(s) IV Push daily  sodium chloride 0.9%. 1000 milliLiter(s) (10 mL/Hr) IV Continuous <Continuous>    MEDICATIONS  (PRN):  dextrose Oral Gel 15 Gram(s) Oral once PRN Blood Glucose LESS THAN 70 milliGRAM(s)/deciliter    RADIOLOGY & ADDITIONAL TESTS:  post-op CXR: no acute pathology, no PTX   
Patient discussed on morning rounds with Dr. Mancera    Operation / Date: 5/17 TAVR robert    Surgeon: Calderon    Referring Physician: Gonzales Najera    SUBJECTIVE ASSESSMENT:  82y Female seen and examined at bedside.  Patient with no complaints.  Denies chest pain, shortness of breath, nausea, vomiting.    Hospital Course:  83 y/o F with PMH significant for HTN, breast cancer, chronic diastolic CHF, severe AS and CAD presented to this admission for surgical intervention for her aortic stenosis with associated symptoms of chest pain.  Patient underwent diagnostic cath with result of non obstructive CAD.  Echocardiogram reveals EH 65%, mild LVH with CARMELITA 1.1cm2, PG of 71mmHg and MG of 34mmHg. On 5/17/22 pt underwent TAVR Robert valve. No rhythm changes. Arrived to 9L under ICU level care. POD 1 ECHO performed which showed....    Vital Signs Last 24 Hrs  T(C): 36.3 (18 May 2022 09:08), Max: 36.6 (18 May 2022 01:01)  T(F): 97.3 (18 May 2022 09:08), Max: 97.9 (18 May 2022 01:01)  HR: 85 (18 May 2022 09:00) (59 - 85)  BP: 127/63 (18 May 2022 09:00) (119/59 - 147/67)  BP(mean): 88 (18 May 2022 09:00) (83 - 98)  RR: 18 (18 May 2022 09:00) (14 - 21)  SpO2: 93% (18 May 2022 09:00) (93% - 100%)  I&O's Detail    17 May 2022 07:01  -  18 May 2022 07:00  --------------------------------------------------------  IN:    IV PiggyBack: 100 mL    Oral Fluid: 580 mL  Total IN: 680 mL    OUT:    Voided (mL): 300 mL  Total OUT: 300 mL    Total NET: 380 mL      18 May 2022 07:01  -  18 May 2022 11:15  --------------------------------------------------------  IN:    Oral Fluid: 280 mL  Total IN: 280 mL    OUT:    Voided (mL): 250 mL  Total OUT: 250 mL    Total NET: 30 mL        PHYSICAL EXAM:    General: well appearing resting supine in NAD   Neurological: AOx3. Motor skills grossly intact  Cardiovascular: Normal S1/S2. Regular rate/rhythm. No murmurs  Respiratory: Lungs CTA bilaterally. No wheezing or rales  Gastrointestinal: +BS in all 4 quadrants. Non-distended. Soft. Non-tender  Extremities: Strength 5/5 b/l upper/lower extremities. Sensation grossly intact upper/lower extremities. No edema. No calf tenderness.  Vascular: Radial 2+bilaterally, DP 2+ b/l  Incision Sites: b/l groin incisions without erythema, purulence or ecchymosis.   LABS:                        11.0   9.55  )-----------( 145      ( 18 May 2022 03:20 )             33.0       COUMADIN:  No.        DOSE:                  INDICATION:                GOAL INR:    PT/INR - ( 18 May 2022 03:20 )   PT: 14.2 sec;   INR: 1.19          PTT - ( 18 May 2022 03:20 )  PTT:25.0 sec    05-18    139  |  108  |  18  ----------------------------<  106<H>  3.7   |  22  |  0.79    Ca    8.8      18 May 2022 03:20  Phos  3.0     05-18  Mg     1.6     05-18    TPro  5.7<L>  /  Alb  3.5  /  TBili  0.6  /  DBili  x   /  AST  24  /  ALT  13  /  AlkPhos  65  05-18          MEDICATIONS  (STANDING):  anastrozole 1 milliGRAM(s) Oral daily  aspirin enteric coated 81 milliGRAM(s) Oral daily  atorvastatin 20 milliGRAM(s) Oral at bedtime  ceFAZolin   IVPB 2000 milliGRAM(s) IV Intermittent every 8 hours  chlorhexidine 2% Cloths 1 Application(s) Topical daily  heparin   Injectable 5000 Unit(s) SubCutaneous every 8 hours  pantoprazole  Injectable 40 milliGRAM(s) IV Push daily  senna 2 Tablet(s) Oral at bedtime  sodium chloride 0.9%. 1000 milliLiter(s) (10 mL/Hr) IV Continuous <Continuous>      Discharge CXR:    Discharge ECHO:

## 2022-05-18 NOTE — DISCHARGE NOTE PROVIDER - NSDCFUADDINST_GEN_ALL_CORE_FT
-Walk daily as tolerated and use your incentive spirometer every hour.    -No driving or strenuous activity/exercise for 6 weeks, or until cleared by your surgeon.    -Gently clean your incisions with anti-bacterial soap and water, pat dry.  You may leave them open to air.    -Call your doctor if you have shortness of breath, chest pain not relieved by pain medication, dizziness, fever >101.5, or increased redness or drainage from incisions.  -Walk daily as tolerated and use your incentive spirometer every hour.    -No driving or strenuous activity/exercise for 6 weeks, or until cleared by your surgeon.    -Gently clean your incisions with anti-bacterial soap and water, pat dry.  You may leave them open to air.    -Call your doctor if you have shortness of breath, chest pain not relieved by pain medication, dizziness, fever >101.5, or increased redness or drainage from incisions.     You had a MCOT monitor (an external cardiac rhythm monitoring device) placed on your day of discharge.  This helps us monitor your heart while you are out of the hospital for 30 days after discharge. Should your heart go into an abnormal or dangerous rhythm you will receieve a call from the MCOT team and your Structural Heart team of Doctors and PA's will be notified.    1. Keep the monitor within 30 feet of you at all times.  2. When you feel any symptom (chest pain, dizziness, palpitations, weakness, fatigue or anything outside of your normal), press the “Record Symptoms” button on the main phone of your phone  3. Shower or exercise as normal whilewearing the MCOT Patch. Do not swim or take a bath. Patch is water-resistant, not waterproof  4. When the battery is low on the phone or on the device, use the supplied . The monitor will show a warning message when the battery is low.  5. Do not remove the patch from yourskin after you begin monitoring. With normal wear, each patch should last 5 days. To replace the patch follow instructions in the MCOT box with the Patch Guide  6. Any issues with the MCOT device or phone please call [employer] at [phone].  7. If you have any other questions at all please call the Structural Heart office at [phone]

## 2022-05-18 NOTE — PHYSICAL THERAPY INITIAL EVALUATION ADULT - PERTINENT HX OF CURRENT PROBLEM, REHAB EVAL
This 82 year old female with PMH significant for HTN, breast cancer, chronic diastolic CHF, severe AS and CAD presented to this admission for surgical intervention for her aortic stenosis with associated symptoms of chest pain.

## 2022-05-18 NOTE — DISCHARGE NOTE PROVIDER - NSDCCPCAREPLAN_GEN_ALL_CORE_FT
PRINCIPAL DISCHARGE DIAGNOSIS  Diagnosis: Severe aortic stenosis  Assessment and Plan of Treatment:       SECONDARY DISCHARGE DIAGNOSES  Diagnosis: Hypertension  Assessment and Plan of Treatment:     Diagnosis: CAD (coronary artery disease)  Assessment and Plan of Treatment:     Diagnosis: Breast cancer  Assessment and Plan of Treatment:

## 2022-05-18 NOTE — DISCHARGE NOTE PROVIDER - PROVIDER TOKENS
FREE:[LAST:[Calderon],FIRST:[Dusty],PHONE:[phone]],FAX:[(   [phone]    -],ADDRESS:[[address] [place] [address]],SCHEDULEDAPPT:[05/27/2022],SCHEDULEDAPPTTIME:[10:45 AM]],PROVIDER:[TOKEN:[[id number]],SCHEDULEDAPPT:[06/07/2022],SCHEDULEDAPPTTIME:[03:30 PM]]

## 2022-05-18 NOTE — DISCHARGE NOTE NURSING/CASE MANAGEMENT/SOCIAL WORK - NSDCPEFALRISK_GEN_ALL_CORE
For information on Fall & Injury Prevention, visit: [email or web address] OR  [email or web address] OR  https://www.cdc.gov/steadi/patient.html

## 2022-05-18 NOTE — DISCHARGE NOTE PROVIDER - CARE PROVIDER_API CALL
Dusty Mancera  [address] [place] [address]  Phone: [phone]  Fax: (   )    -  Scheduled Appointment: 05/27/2022 10:45 AM    Gonzales Najera)  Internal Medicine  [address] [place] [address]  Phone: [phone]  Fax: [phone]  Scheduled Appointment: 06/07/2022 03:30 PM

## 2022-05-18 NOTE — DISCHARGE NOTE NURSING/CASE MANAGEMENT/SOCIAL WORK - PATIENT PORTAL LINK FT
You can access the FollowMyHealth Patient Portal offered by [facility] by registering at the following website: [email or web address]. By joining [facility] FollowMyHealth portal, you will also be able to view your health information using other applications (apps) compatible with our system.

## 2022-05-18 NOTE — DISCHARGE NOTE PROVIDER - NSDCFUSCHEDAPPT_GEN_ALL_CORE_FT
Dusty Mancera  [facility] Physician [facility]  [facility] [address] [facility]  Scheduled Appointment: 05/27/2022

## 2022-05-19 ENCOUNTER — APPOINTMENT (OUTPATIENT)
Dept: CARE COORDINATION | Facility: HOME HEALTH | Age: 82
End: 2022-05-19

## 2022-05-19 DIAGNOSIS — Z95.2 PRESENCE OF PROSTHETIC HEART VALVE: ICD-10-CM

## 2022-05-19 RX ORDER — SENNOSIDES 8.6 MG TABLETS 8.6 MG/1
8.6 TABLET ORAL AT BEDTIME
Refills: 0 | Status: ACTIVE | COMMUNITY
Start: 2022-05-19

## 2022-05-19 RX ORDER — ASPIRIN 81 MG
81 TABLET, DELAYED RELEASE (ENTERIC COATED) ORAL
Refills: 0 | Status: ACTIVE | COMMUNITY

## 2022-05-19 RX ORDER — LOSARTAN POTASSIUM 100 MG/1
100 TABLET, FILM COATED ORAL
Refills: 0 | Status: DISCONTINUED | COMMUNITY
End: 2022-05-19

## 2022-05-19 RX ORDER — ACETAMINOPHEN 500 MG/1
500 TABLET, COATED ORAL
Refills: 0 | Status: ACTIVE | COMMUNITY
Start: 2022-05-19

## 2022-05-19 RX ORDER — ATORVASTATIN CALCIUM 20 MG/1
20 TABLET, FILM COATED ORAL
Qty: 90 | Refills: 0 | Status: ACTIVE | COMMUNITY
Start: 2020-03-17

## 2022-05-19 RX ORDER — PANTOPRAZOLE SODIUM 40 MG/1
40 TABLET, DELAYED RELEASE ORAL DAILY
Refills: 0 | Status: ACTIVE | COMMUNITY
Start: 2022-05-19

## 2022-05-20 NOTE — HISTORY OF PRESENT ILLNESS
[FreeTextEntry1] : FOLLOW YOUR HEART HOME VISIT-[facility] [employer]\par Telephonic visit made to Ms. Chapa   Patient of  s/p TAVR  on  5/17/22.  Discharge on 5/18/22 from [facility].\par 24hr for post discharge transitional care management and post follow up. Spoke with (patient/ family member) for follow up s/p hospitalization.\par \par Ms. Chapa is a 82 year old female with PMH significant for HTN, breast cancer, chronic diastolic CHF, severe AS and CAD presented to this admission for surgical intervention for her aortic stenosis with associated symptoms of chest pain.  Patient underwent diagnostic cath with result of non obstructive CAD.  Echocardiogram reveals EH 65%, mild LVH with CARMELITA 1.1cm2, PG of 71mmHg and MG of 34mmHg. On 5/17/22 pt underwent TAVR Ascencion valve. No rhythm changes. Arrived to 9L under ICU level care. Echo obtained & indicated no pericardial effusion.\par Pt. reports feeling well.  Denies any SOB, chest pain, N/V, fever, chills, or calf tenderness.  Medications reviewed with patient & daughter, compliant with all medications as per d/c order with +teach back. Reminded patient about TCM program, role of care navigator, clinical call center, yellow card with contact information.  Advised to call with any questions/concerns, verbalized understanding.\par \par \par

## 2022-05-20 NOTE — REASON FOR VISIT
[Home] : at home, [unfilled] , at the time of the visit. [Other Location: e.g. Home (Enter Location, City,State)___] : at [unfilled] [Family Member] : family member [Verbal consent obtained from patient] : the patient, [unfilled] [FreeTextEntry4] : Love (daughter)

## 2022-05-23 DIAGNOSIS — I50.32 CHRONIC DIASTOLIC (CONGESTIVE) HEART FAILURE: ICD-10-CM

## 2022-05-23 DIAGNOSIS — I35.0 NONRHEUMATIC AORTIC (VALVE) STENOSIS: ICD-10-CM

## 2022-05-23 DIAGNOSIS — E66.9 OBESITY, UNSPECIFIED: ICD-10-CM

## 2022-05-23 DIAGNOSIS — Z79.82 LONG TERM (CURRENT) USE OF ASPIRIN: ICD-10-CM

## 2022-05-23 DIAGNOSIS — C50.911 MALIGNANT NEOPLASM OF UNSPECIFIED SITE OF RIGHT FEMALE BREAST: ICD-10-CM

## 2022-05-23 DIAGNOSIS — Z00.6 ENCOUNTER FOR EXAMINATION FOR NORMAL COMPARISON AND CONTROL IN CLINICAL RESEARCH PROGRAM: ICD-10-CM

## 2022-05-23 DIAGNOSIS — I11.0 HYPERTENSIVE HEART DISEASE WITH HEART FAILURE: ICD-10-CM

## 2022-05-23 DIAGNOSIS — I25.10 ATHEROSCLEROTIC HEART DISEASE OF NATIVE CORONARY ARTERY WITHOUT ANGINA PECTORIS: ICD-10-CM

## 2022-05-27 ENCOUNTER — APPOINTMENT (OUTPATIENT)
Dept: HEART AND VASCULAR | Facility: CLINIC | Age: 82
End: 2022-05-27
Payer: MEDICARE

## 2022-05-27 ENCOUNTER — NON-APPOINTMENT (OUTPATIENT)
Age: 82
End: 2022-05-27

## 2022-05-27 VITALS
DIASTOLIC BLOOD PRESSURE: 84 MMHG | TEMPERATURE: 98.7 F | BODY MASS INDEX: 36.8 KG/M2 | HEART RATE: 75 BPM | WEIGHT: 200 LBS | SYSTOLIC BLOOD PRESSURE: 137 MMHG | OXYGEN SATURATION: 98 % | HEIGHT: 62 IN

## 2022-05-27 PROCEDURE — 99214 OFFICE O/P EST MOD 30 MIN: CPT

## 2022-05-27 NOTE — REASON FOR VISIT
[Structural Heart and Valve Disease] : structural heart and valve disease [Coronary Artery Disease] : coronary artery disease [FreeTextEntry1] : 83 y/o F with pmhx of HTN, hx of breast cancer (s/p lumpectomy on anastrozle), chronic diastolic heart failure, severe AS, nonobstructive CAD here for follow up after TAVR. Pt went uncomplicated TAVR with a 23mm Ascencion Ultra S3U.\par Stable NYHA 1 symptoms now\par No issues since discharge\par \par \par Imaging/Testing reviewed\par TTE 5/18/2022-\par CONCLUSIONS:\par \par  1. Normal left ventricular size and systolic function.\par  2. Mild symmetric left ventricular hypertrophy.\par  3. Normal right ventricular size and systolic function.\par  4. Normal atria.\par  5. 23 mm Ascencion 3 Ultra valve is noted in the aortic position, with \par normal function. There is no evidence of aortic regurgitation. The peak \par transvalvular velocity is 3.60 m/s, the mean transvalvular gradient is \par 24.00 mmHg, and the LVOT/AV velocity ratio is 0.47.\par  6. No evidence of pulmonary hypertension.\par  7. No pericardial effusion.\par  8. No prior echo is available for comparison.\par \par Cardiac cath 2/2021- nonobstructive CAD (images reviewed from 2/2021 cath), co-dominant system\par \par EKG today- reviewed, NSR, normal EKG\par \par TAVR 5/17/2022- Successful TAVR with a 23mm Ascencion S3U

## 2022-05-27 NOTE — REVIEW OF SYSTEMS
[Fever] : no fever [Blurry Vision] : no blurred vision [Earache] : no earache [SOB] : no shortness of breath [Dyspnea on exertion] : not dyspnea during exertion [Cough] : no cough [Abdominal Pain] : no abdominal pain [Dysuria] : no dysuria [Joint Pain] : no joint pain [Rash] : no rash [Dizziness] : no dizziness [Confusion] : no confusion was observed [Easy Bleeding] : no tendency for easy bleeding

## 2022-05-27 NOTE — PHYSICAL EXAM
[Well Developed] : well developed [Well Nourished] : well nourished [No Acute Distress] : no acute distress [Normal Conjunctiva] : normal conjunctiva [Normal Venous Pressure] : normal venous pressure [No Carotid Bruit] : no carotid bruit [Normal S1, S2] : normal S1, S2 [No Murmur] : no murmur [No Rub] : no rub [No Gallop] : no gallop [Clear Lung Fields] : clear lung fields [Good Air Entry] : good air entry [No Respiratory Distress] : no respiratory distress  [Soft] : abdomen soft [Non Tender] : non-tender [No Masses/organomegaly] : no masses/organomegaly [Normal Bowel Sounds] : normal bowel sounds [Normal Gait] : normal gait [No Edema] : no edema [No Cyanosis] : no cyanosis [No Clubbing] : no clubbing [No Varicosities] : no varicosities [No Rash] : no rash [No Skin Lesions] : no skin lesions [Moves all extremities] : moves all extremities [No Focal Deficits] : no focal deficits [Normal Speech] : normal speech [Alert and Oriented] : alert and oriented [Normal memory] : normal memory [Obese] : obese [de-identified] : +bruises on femoral sites, no hematoma

## 2022-05-27 NOTE — ASSESSMENT
[FreeTextEntry1] : 81 y/o F with pmhx of HTN, hx of breast cancer (s/p lumpectomy on anastrozle), chronic diastolic heart failure, severe AS, nonobstructive CAD here for follow up after TAVR. Pt went uncomplicated TAVR with a 23mm Ascencion Ultra S3U.\par Stable NYHA 1 symptoms now\par -euvolemic on exam\par -aspirin 81mg daily\par -BP/HR well controlled in office, but readings high at home. Will restart losartan with daily BP logs (instructed to hold for SBP <100mmHg)\par -f/u with Dr. Najera\par -1 month TTE with Dr. Najera

## 2022-06-01 ENCOUNTER — TRANSCRIPTION ENCOUNTER (OUTPATIENT)
Age: 82
End: 2022-06-01

## 2022-06-16 ENCOUNTER — TRANSCRIPTION ENCOUNTER (OUTPATIENT)
Age: 82
End: 2022-06-16

## 2022-06-27 ENCOUNTER — APPOINTMENT (OUTPATIENT)
Dept: HEART AND VASCULAR | Facility: CLINIC | Age: 82
End: 2022-06-27
Payer: MEDICARE

## 2022-06-27 VITALS
WEIGHT: 200 LBS | HEIGHT: 62 IN | BODY MASS INDEX: 36.8 KG/M2 | TEMPERATURE: 98.7 F | HEART RATE: 71 BPM | SYSTOLIC BLOOD PRESSURE: 130 MMHG | OXYGEN SATURATION: 97 % | DIASTOLIC BLOOD PRESSURE: 70 MMHG

## 2022-06-27 PROCEDURE — 99214 OFFICE O/P EST MOD 30 MIN: CPT

## 2022-06-27 RX ORDER — CEFUROXIME AXETIL 500 MG/1
500 TABLET ORAL
Qty: 14 | Refills: 0 | Status: ACTIVE | COMMUNITY
Start: 2022-06-04

## 2022-06-27 RX ORDER — LOSARTAN POTASSIUM 100 MG/1
100 TABLET, FILM COATED ORAL
Refills: 0 | Status: ACTIVE | COMMUNITY

## 2022-06-27 RX ORDER — CEPHALEXIN 500 MG/1
500 CAPSULE ORAL
Qty: 14 | Refills: 0 | Status: ACTIVE | COMMUNITY
Start: 2022-01-28

## 2022-06-27 RX ORDER — AMLODIPINE BESYLATE 5 MG/1
5 TABLET ORAL
Qty: 90 | Refills: 0 | Status: ACTIVE | COMMUNITY
Start: 2022-06-03

## 2022-06-27 NOTE — REASON FOR VISIT
[Structural Heart and Valve Disease] : structural heart and valve disease [Coronary Artery Disease] : coronary artery disease [FreeTextEntry1] : 83 y/o F with pmhx of HTN, hx of breast cancer (s/p lumpectomy on anastrozle), chronic diastolic heart failure, severe AS, nonobstructive CAD here for follow up after TAVR. Pt went uncomplicated TAVR with a 23mm Ascencion Ultra S3U.\par Stable NYHA 1 symptoms now\par No issues since discharge except for vertigo\par \par \par Imaging/Testing reviewed\par TTE 5/18/2022-\par CONCLUSIONS:\par \par  1. Normal left ventricular size and systolic function.\par  2. Mild symmetric left ventricular hypertrophy.\par  3. Normal right ventricular size and systolic function.\par  4. Normal atria.\par  5. 23 mm Ascencion 3 Ultra valve is noted in the aortic position, with \par normal function. There is no evidence of aortic regurgitation. The peak \par transvalvular velocity is 3.60 m/s, the mean transvalvular gradient is \par 24.00 mmHg, and the LVOT/AV velocity ratio is 0.47.\par  6. No evidence of pulmonary hypertension.\par  7. No pericardial effusion.\par  8. No prior echo is available for comparison.\par \par Cardiac cath 2/2021- nonobstructive CAD (images reviewed from 2/2021 cath), co-dominant system\par \par EKG today- reviewed, NSR, normal EKG\par \par TAVR 5/17/2022- Successful TAVR with a 23mm Ascencion S3U

## 2022-06-27 NOTE — ASSESSMENT
[FreeTextEntry1] : 81 y/o F with pmhx of HTN, hx of breast cancer (s/p lumpectomy on anastrozle), chronic diastolic heart failure, severe AS, nonobstructive CAD here for follow up after TAVR. Pt went uncomplicated TAVR with a 23mm Ascencion Ultra S3U.\par Stable NYHA 1 symptoms now\par -euvolemic on exam\par -aspirin 81mg daily\par -BP/HR well controlled on losartan.\par -f/u with Dr. Najera\par -1 month TTE shows well functioning THV.\par - ENT consult for vertigo

## 2022-07-28 ENCOUNTER — TRANSCRIPTION ENCOUNTER (OUTPATIENT)
Age: 82
End: 2022-07-28

## 2022-07-30 ENCOUNTER — APPOINTMENT (OUTPATIENT)
Dept: DISASTER EMERGENCY | Facility: HOSPITAL | Age: 82
End: 2022-07-30

## 2022-09-27 ENCOUNTER — APPOINTMENT (OUTPATIENT)
Dept: BREAST CENTER | Facility: CLINIC | Age: 82
End: 2022-09-27

## 2022-09-27 VITALS
SYSTOLIC BLOOD PRESSURE: 119 MMHG | BODY MASS INDEX: 36.8 KG/M2 | WEIGHT: 200 LBS | HEIGHT: 62 IN | HEART RATE: 75 BPM | DIASTOLIC BLOOD PRESSURE: 74 MMHG

## 2022-09-27 DIAGNOSIS — Z12.31 ENCOUNTER FOR SCREENING MAMMOGRAM FOR MALIGNANT NEOPLASM OF BREAST: ICD-10-CM

## 2022-09-27 DIAGNOSIS — Z86.16 PERSONAL HISTORY OF COVID-19: ICD-10-CM

## 2022-09-27 DIAGNOSIS — Z92.21 PERSONAL HISTORY OF ANTINEOPLASTIC CHEMOTHERAPY: ICD-10-CM

## 2022-09-27 DIAGNOSIS — R92.8 OTHER ABNORMAL AND INCONCLUSIVE FINDINGS ON DIAGNOSTIC IMAGING OF BREAST: ICD-10-CM

## 2022-09-27 PROCEDURE — 99213 OFFICE O/P EST LOW 20 MIN: CPT

## 2022-09-27 NOTE — HISTORY OF PRESENT ILLNESS
[FreeTextEntry1] : This is a 82 year old female s/p right axillary dissection on 08/18/2017. Pathology showed negative for metastasis in 0/4 lymph nodes, no residual carcinoma identified. Patient is s/p right PMX and right excisional biopsy on 07/13/2017. 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 Patient is s/p right breast MRI guided biopsy on 06/19/2017. Pathology showed right breast outer: mildly dilated ducts in a background of predominantly adipocytic mammary parenchyma with patchy stromal fibrosis, right breast retro: small duct papillomata; duct ectasia; sparse intraluminal calcifications; stromal sclerosis (fibrosis). Patient is s/p right nipple punch biopsy on 06/14/2017. Pathology showed right nipple IDC, grade 5/9, up to 3mm, suspicious for dermal lymphatic invasion, ER/NV positive, HER2 negative, Ki67 low. Breast triple stain shows cytokeratin 8/18 positive epithelial cells without surrounding myoepithelial cells (CK5 an P63 negative). Patient originally referred by Dr. Ty for a right nipple inversion.\par \par Patient completed six months of IV CMF (Dr. Ortiz) in 8/2019. Patient was last seen by Dr. Ortiz on 9/10/2020 . Patient is s/p  radiation therapy on  4/20/2018(Dr. Siegel).  She started anastrozole therapy on 5/11/2018. She has c/o of nausea when she is tired since she started it. She also has c/o of insomnia and leg cramps since the anastrozole.  She has taken Zofran for these symptoms with relief. \par \par Patient is s/p left breast usd guided biopsy (heart shaped clip) on 01/26/2018. Pathology showed left retroareolar breast demonstrated focal UDH, duct ectasia with luminal histiocytes and periductal fibrosis sclerosis, apocrine cysts. Patient states the  right arm numbness is improving . Patient c/o  of right breast pain is improving.  \par She fell in June 2018 at her home in the front yard. She sustained some pain to her right sided thorax. But did not break anything or have any injuries.\par \par She completed a CMF chemotherapy on January 2018. She completed radiation therapy on April 20, 2018. A total of 6040 cGy was delivered to the right breast tumor bed. 2 weeks ago she noted to have right arm and breast swelling. She saw Dr. Ortiz and was referred for physical therapy. She has been seeing [facility] for the past 3 months. She c/o right breast decreased  strength and feels her fingers on her right hand feels "weird" and this sensation wasn't there before. She mentioned to her PT.\par \par She is on Arimidex which was recalled and she is now on letrozole for past 2-3 weeks ago. She saw Dr. Cali and was told to follow up with Dr. Ortiz regarding AI toxicity. Her  passed 2/2020.\par \par She was found to have rib fx on right on recent CT chest 9/2020. She had a cardiac cath with Dr. Gallego she had a slight blockage 30% and has been feeling better since the cath. \par \par She had call back imaging today and needs right breast stereotactic bx. She has some depression related to her husbands loss.\par \par She is s/p right breast stereotactic biopsy on 9/24/2021, pathology showed benign breast tissue with dense fibrous scar associated with calcifications.\par \par She had a left knee patellar fx post fall at the [facility] [place]. She has upcoming heart surgery at [facility].\par \par Patient denies any breast complaints today. \par \par She does SBE. \par She has not noticed a change in her breast or any left breast lump. Patient noticed right breast lump. \par She has noticed a change in her nipple or nipple area. \par She has  noticed a change in the skin of the breast.  \par She is not experiencing nipple discharge.\par She is not experiencing any left breast pain. \par She has not noticed a lump or lymph node under the armpit. \par \par BREAST CANCER RISK FACTORS\par Menarche: 11\par Menopause: 44\par Grav: 5     Para:  4\par Age at first live birth: 20\par Nursed: no\par Hysterectomy: no \par Oophorectomy: no\par OCP: yes  on and off for approx 10-15 years\par HRT: no\par Last pap/pelvic exam: 4/2017 WNL\par Related family history:  mother BCA@55\par Ashkenazi: no\par Mastery risk assessment:  BRCA 2.2%, TCv6 6.1%, TCv7 4.7%, Shannon 5.36%, Kirill 0.3%\par BRCA testing: no\par Bra size: 42B\par \par Last mammogram: 9/14/2022 right                 Location: [place]\par Report reviewed.                                           Images reviewed on PACS\par Results: Birads 3\par The focal asymmetry of interest in the right outer breast likely reflects postbiopsy change along the prior stereotactic biopsy tract.\par \par Last ultrasound: 9/2/2021                   Location: [facility]\par Report reviewed.                                 Images reviewed. \par Results: Birads 2\par No suspicious ultrasound abnormality. \par \par Last MRI:    06/12/2017                        Location: [facility]\par Report reviewed.                                 Images reviewed. \par Results: Birads 4\par Right breast: Nipple retraction with intense enhancement of the retracted nipple. Branching linear enhancement at 11:00 and a focus of nodular enhancement at 9:00; MR guided biopsy recommended. \par Repeat targeted usd of the breast shows a focus of retroareolar ductal dilatation and a probable mildly complicated subcutaneous cyst measuring 3mm at 10:00.  Left breast: No evidence of malignancy\par

## 2022-09-27 NOTE — PHYSICAL EXAM
[Normocephalic] : normocephalic [Atraumatic] : atraumatic [Supple] : supple [No Supraclavicular Adenopathy] : no supraclavicular adenopathy [Examined in the supine and seated position] : examined in the supine and seated position [Symmetrical] : symmetrical [No dominant masses] : no dominant masses in right breast  [No dominant masses] : no dominant masses left breast [No Nipple Retraction] : no left nipple retraction [No Nipple Discharge] : no left nipple discharge [Breast Mass Left Breast ___cm] : no masses [No Axillary Lymphadenopathy] : no left axillary lymphadenopathy [No Edema] : no edema [No Swelling] : no swelling [Full ROM] : full range of motion [No Rashes] : no rashes [No Ulceration] : no ulceration [de-identified] : s/p PMx, 3-4cm firm area of fat necrosis, stable without change central incision, mild brawny induration c/w RTx,  stable from prior [de-identified] : healed axillary incision,no arm swelling,  no drainage, motor function intact [de-identified] :  strength is equal bilaterally

## 2022-09-27 NOTE — ASSESSMENT
[FreeTextEntry1] : 82 year-old female followed for right breast cancer stage IIA, TURNER\par \par Plan bilateral ultrasound 9/2022 - patient had her mammogram, but needs to have the ultrasound done ASAP\par Right breast diagnostic mammogram for 6 month follow-up\par Continue AI and surveillance per Dr. Ortiz\par Recommend topical vitamin E topical therapy to right breast central prn\par not recommending breast MRI at this time\par f/u 1 year\par She knows to call or return sooner should any concerns or questions arise.\par \par

## 2022-10-03 ENCOUNTER — RESULT REVIEW (OUTPATIENT)
Age: 82
End: 2022-10-03

## 2023-01-13 ENCOUNTER — RX RENEWAL (OUTPATIENT)
Age: 83
End: 2023-01-13

## 2023-03-14 ENCOUNTER — RESULT REVIEW (OUTPATIENT)
Age: 83
End: 2023-03-14

## 2023-05-04 ENCOUNTER — NON-APPOINTMENT (OUTPATIENT)
Age: 83
End: 2023-05-04

## 2023-07-17 ENCOUNTER — OFFICE (OUTPATIENT)
Dept: URBAN - METROPOLITAN AREA CLINIC 122 | Facility: CLINIC | Age: 83
Setting detail: OPHTHALMOLOGY
End: 2023-07-17
Payer: MEDICARE

## 2023-07-17 DIAGNOSIS — H25.12: ICD-10-CM

## 2023-07-17 DIAGNOSIS — G43.109: ICD-10-CM

## 2023-07-17 DIAGNOSIS — H43.393: ICD-10-CM

## 2023-07-17 DIAGNOSIS — H02.822: ICD-10-CM

## 2023-07-17 DIAGNOSIS — H35.033: ICD-10-CM

## 2023-07-17 DIAGNOSIS — Z96.1: ICD-10-CM

## 2023-07-17 DIAGNOSIS — H02.825: ICD-10-CM

## 2023-07-17 PROCEDURE — 92014 COMPRE OPH EXAM EST PT 1/>: CPT | Performed by: OPHTHALMOLOGY

## 2023-07-17 PROCEDURE — 92134 CPTRZ OPH DX IMG PST SGM RTA: CPT | Performed by: OPHTHALMOLOGY

## 2023-07-17 ASSESSMENT — REFRACTION_CURRENTRX
OS_OVR_VA: 20/
OD_AXIS: 176
OD_CYLINDER: -2.00
OD_OVR_VA: 20/
OS_SPHERE: +0.75
OS_AXIS: 176
OD_CYLINDER: -2.00
OD_ADD: +2.75
OS_OVR_VA: 20/
OS_SPHERE: +0.75
OD_SPHERE: +1.00
OS_CYLINDER: -1.25
OD_ADD: +2.50
OD_SPHERE: +1.00
OS_CYLINDER: -1.50
OD_AXIS: 1
OS_AXIS: 180
OD_OVR_VA: 20/
OS_ADD: +2.75
OS_VPRISM_DIRECTION: PROGS
OD_VPRISM_DIRECTION: PROGS
OS_ADD: +2.50

## 2023-07-17 ASSESSMENT — REFRACTION_MANIFEST
OD_VA1: 20/20
OS_AXIS: 170
OD_AXIS: 180
OD_ADD: +2.50
OS_ADD: +2.50
OS_SPHERE: +1.25
OD_VA1: 20/20
OS_CYLINDER: -1.00
OD_ADD: +2.50
OS_VA1: 20/20
OS_VA1: 20/20
OD_CYLINDER: -1.25
OS_AXIS: 180
OD_CYLINDER: -2.00
OD_SPHERE: +0.75
OD_AXIS: 165
OS_CYLINDER: -1.25
OS_SPHERE: +0.50
OS_ADD: +2.50
OD_SPHERE: +1.25

## 2023-07-17 ASSESSMENT — TONOMETRY
OD_IOP_MMHG: 14
OS_IOP_MMHG: 14

## 2023-07-17 ASSESSMENT — SPHEQUIV_DERIVED
OS_SPHEQUIV: 0.625
OD_SPHEQUIV: 0.375
OD_SPHEQUIV: 0.625
OD_SPHEQUIV: -0.25
OS_SPHEQUIV: -0.125
OS_SPHEQUIV: 0.75

## 2023-07-17 ASSESSMENT — VISUAL ACUITY
OD_BCVA: 20/25-
OS_BCVA: 20/25+2

## 2023-07-17 ASSESSMENT — REFRACTION_AUTOREFRACTION
OS_AXIS: 171
OD_CYLINDER: -1.75
OS_CYLINDER: -1.25
OD_SPHERE: +1.25
OD_AXIS: 177
OS_SPHERE: +1.25

## 2023-07-17 ASSESSMENT — CONFRONTATIONAL VISUAL FIELD TEST (CVF)
OS_FINDINGS: FULL
OD_FINDINGS: FULL

## 2023-07-18 NOTE — PATIENT PROFILE ADULT - FUNCTIONAL ASSESSMENT - BASIC MOBILITY 4.
Patient is a 38 y/o female with past psychiatric history of psychotic depression with catatonia and post-traumatic stress disorder, one prior psych admission Nov 2021, follows at [facility] outpatient, no hx suicide attempt, hx SIB by cutting, no known hx of violence, no known substance use, hx trauma, BIB EMS activated by mother with concern for catatonia. Working Dx MDD, recurrent episode, with severe catatonia.    Met with patient in her room today who was able to engage in the interview. Patient reports current appetite is good due to taking Abilify and she has been craving for snacks with increased intake of cookies and chips offered during the afternoon snack time. Patient self-reports a hx of eating disorder with writer, + restricting and purging in the past "a long time ago", reports currently 'in relapse' since she is on Abilify -- with urge to binge and purge on the unit, last purged yesterday. Writer discussed the importance of adequate po intake with healthy food choices, listening to hunger cues, and managing anxiety for her binging/purging behaviors. Patient verbalized understanding and accepted MyPlate handouts. Patient denies chewing/swallowing difficulties on current diet. NKFA reported. Patient follows a lacto-vegetarian diet - no cheese/milk but ok with yogurt. Menu options explored with patient today, food preferences taken and honored on CBoard to incorporate healthier food/snack options. Patient also requests for Orgain x1 daily @ breakfast. c/w Multivitamin w/ minerals for micronutrient coverage per MD order. Wt per flowsheet: 125lb (7/7), 112lb (7/15) -- ? wt accuracy, patient states her -130lb, denies recent significant wt changes PTA. Will cont to monitor wt trend. 
4 = No assist / stand by assistance

## 2023-08-18 ENCOUNTER — APPOINTMENT (OUTPATIENT)
Dept: BREAST CENTER | Facility: CLINIC | Age: 83
End: 2023-08-18
Payer: MEDICARE

## 2023-08-18 VITALS
SYSTOLIC BLOOD PRESSURE: 130 MMHG | HEART RATE: 74 BPM | HEIGHT: 62 IN | DIASTOLIC BLOOD PRESSURE: 65 MMHG | WEIGHT: 200 LBS | BODY MASS INDEX: 36.8 KG/M2

## 2023-08-18 DIAGNOSIS — C50.911 MALIGNANT NEOPLASM OF UNSPECIFIED SITE OF RIGHT FEMALE BREAST: ICD-10-CM

## 2023-08-18 DIAGNOSIS — M79.621 PAIN IN RIGHT UPPER ARM: ICD-10-CM

## 2023-08-18 DIAGNOSIS — C77.3 MALIGNANT NEOPLASM OF UNSPECIFIED SITE OF RIGHT FEMALE BREAST: ICD-10-CM

## 2023-08-18 PROCEDURE — 99214 OFFICE O/P EST MOD 30 MIN: CPT

## 2023-08-18 RX ORDER — NYSTATIN AND TRIAMCINOLONE ACETONIDE 100000; 1 MG/G; MG/G
100000-0.1 CREAM TOPICAL TWICE DAILY
Qty: 90 | Refills: 1 | Status: ACTIVE | COMMUNITY
Start: 2023-08-18 | End: 1900-01-01

## 2023-08-18 RX ORDER — ANASTROZOLE TABLETS 1 MG/1
1 TABLET ORAL DAILY
Refills: 0 | Status: DISCONTINUED | COMMUNITY
Start: 2022-05-19 | End: 2023-08-18

## 2023-08-18 NOTE — PHYSICAL EXAM
[Normocephalic] : normocephalic [Atraumatic] : atraumatic [Supple] : supple [No Supraclavicular Adenopathy] : no supraclavicular adenopathy [Examined in the supine and seated position] : examined in the supine and seated position [Symmetrical] : symmetrical [No dominant masses] : no dominant masses in right breast  [No dominant masses] : no dominant masses left breast [No Nipple Retraction] : no left nipple retraction [No Nipple Discharge] : no left nipple discharge [Breast Mass Left Breast ___cm] : no masses [No Axillary Lymphadenopathy] : no left axillary lymphadenopathy [No Edema] : no edema [No Swelling] : no swelling [Full ROM] : full range of motion [No Rashes] : no rashes [No Ulceration] : no ulceration [de-identified] : s/p PMx, 3-4cm firm area of fat necrosis, stable without change central incision, mild brawny induration c/w RTx,  stable from prior [de-identified] : healed axillary incision,no arm swelling, no drainage, motor function intact, no redness [de-identified] :  strength is equal bilaterally

## 2023-08-18 NOTE — ASSESSMENT
[FreeTextEntry1] : 83 year-old female followed for right breast cancer stage IIA, TURNER SOZO today WNL, plan right axillary ultrasound plan bilateral mammogram 9/2023 Continue AI and surveillance per Dr. Ortiz Recommend topical vitamin E topical therapy to right breast central prn not recommending breast MRI at this time f/u 1 month as scheduled She knows to call or return sooner should any concerns or questions arise.

## 2023-08-18 NOTE — HISTORY OF PRESENT ILLNESS
[FreeTextEntry1] : This is a 83 year old female s/p right axillary dissection on 08/18/2017. Pathology showed negative for metastasis in 0/4 lymph nodes, no residual carcinoma identified. Patient is s/p right PMX and right excisional biopsy on 07/13/2017. 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 Patient is s/p right breast MRI guided biopsy on 06/19/2017. Pathology showed right breast outer: mildly dilated ducts in a background of predominantly adipocytic mammary parenchyma with patchy stromal fibrosis, right breast retro: small duct papillomata; duct ectasia; sparse intraluminal calcifications; stromal sclerosis (fibrosis). Patient is s/p right nipple punch biopsy on 06/14/2017. 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 Breast triple stain shows cytokeratin 8/18 positive epithelial cells without surrounding myoepithelial cells (CK5 an P63 negative). Patient originally referred by Dr. Ty for a right nipple inversion.  Patient completed six months of IV CMF (Dr. Ortiz) in 8/2019. Patient was last seen by Dr. Ortiz on 9/10/2020 . Patient is s/p  radiation therapy on  4/20/2018(Dr. Siegel).  She started anastrozole therapy on 5/11/2018. She has c/o of nausea when she is tired since she started it. She also has c/o of insomnia and leg cramps since the anastrozole.  She has taken Zofran for these symptoms with relief.   Patient is s/p left breast usd guided biopsy (heart shaped clip) on 01/26/2018. Pathology showed left retroareolar breast demonstrated focal UDH, duct ectasia with luminal histiocytes and periductal fibrosis sclerosis, apocrine cysts. Patient states the  right arm numbness is improving . Patient c/o  of right breast pain is improving.   She fell in June 2018 at her home in the front yard. She sustained some pain to her right sided thorax. But did not break anything or have any injuries.  She completed a CMF chemotherapy on January 2018. She completed radiation therapy on April 20, 2018. A total of 6040 cGy was delivered to the right breast tumor bed. 2 weeks ago she noted to have right arm and breast swelling. She saw Dr. Ortiz and was referred for physical therapy. She has been seeing [facility] for the past 3 months. She c/o right breast decreased  strength and feels her fingers on her right hand feels "weird" and this sensation wasn't there before. She mentioned to her PT.  She is on Arimidex which was recalled and she is now on letrozole for past 2-3 weeks ago. She saw Dr. Cali and was told to follow up with Dr. Ortiz regarding AI toxicity. Her  passed 2/2020.  She was found to have rib fx on right on recent CT chest 9/2020. She had a cardiac cath with Dr. Gallego she had a slight blockage 30% and has been feeling better since the cath.   She had call back imaging today and needs right breast stereotactic bx. She has some depression related to her husbands loss.  She is s/p right breast stereotactic biopsy on 9/24/2021, pathology showed benign breast tissue with dense fibrous scar associated with calcifications.  She had a left knee patellar fx post fall at the [place] [employer] [facility]. She had TAVR at [facility] 5/[facility]. She had a fall at the [facility] a few weeks ago. She is c/o right arm/underarm pain/tingling and swelling. She is accompanied by her daughter.  She does SBE.  She has not noticed a change in her breast or any left breast lump. Patient noticed right axillary lumpiness. She has noticed a change in her nipple or nipple area.  She has  noticed a change in the skin of the breast since radiation. She is not experiencing nipple discharge. She is not experiencing any left breast pain.  She has not noticed a lump or lymph node under the armpit. She c/o right lumpiness in armpit and pain.  BREAST CANCER RISK FACTORS Menarche: 11 Menopause: 44 Grav: 5     Para:  4 Age at first live birth: 20 Nursed: no Hysterectomy: no  Oophorectomy: no OCP: yes  on and off for approx 10-15 years HRT: no Last pap/pelvic exam: 4/2017 WNL Related family history:  mother BCA@55 Ashkenazi: no Mastery risk assessment:  BRCA 2.2%, TCv6 6.1%, TCv7 4.7%, Shannon 5.36%, Kirill 0.3% BRCA testing: no Bra size: 42B  Last mammogram: 3/15/2023 right                 Location: [facility] Report reviewed.                                           Images reviewed on PACS Results: Birads 3 No suspicious interval change, the previously described focal asymmetry with biopsy marker in the right 9:00 axis is stable and does no demonstrate any suspicious interval change. Again, it likely represents postprocedural change along the prior stereotactic biopsy tract. A large area of benign peripherally calcified fat necrosis is again noted in the retro areolar region.   Last ultrasound: 10/4/2022                  Location: [facility] Report reviewed.                                 Images reviewed.  Results: Birads 2 No sonographic evidence of malignancy.  Last MRI:    06/12/2017                        Location: [facility] reviewed.                                 Images reviewed.  Results: Birads 4 Right breast: Nipple retraction with intense enhancement of the retracted nipple. Branching linear enhancement at 11:00 and a focus of nodular enhancement at 9:00; MR guided biopsy recommended.  Repeat targeted usd of the breast shows a focus of retroareolar ductal dilatation and a probable mildly complicated subcutaneous cyst measuring 3mm at 10:00.  Left breast: No evidence of malignancy

## 2023-08-27 ENCOUNTER — RESULT REVIEW (OUTPATIENT)
Age: 83
End: 2023-08-27

## 2023-09-12 ENCOUNTER — APPOINTMENT (OUTPATIENT)
Dept: BREAST CENTER | Facility: CLINIC | Age: 83
End: 2023-09-12
Payer: MEDICARE

## 2023-09-12 VITALS
HEART RATE: 73 BPM | SYSTOLIC BLOOD PRESSURE: 143 MMHG | DIASTOLIC BLOOD PRESSURE: 70 MMHG | HEIGHT: 62 IN | WEIGHT: 200 LBS | BODY MASS INDEX: 36.8 KG/M2

## 2023-09-12 DIAGNOSIS — C50.011 MALIGNANT NEOPLASM OF NIPPLE AND AREOLA, RIGHT FEMALE BREAST: ICD-10-CM

## 2023-09-12 DIAGNOSIS — N60.19 DIFFUSE CYSTIC MASTOPATHY OF UNSPECIFIED BREAST: ICD-10-CM

## 2023-09-12 PROCEDURE — 99213 OFFICE O/P EST LOW 20 MIN: CPT

## 2023-11-27 ENCOUNTER — OFFICE (OUTPATIENT)
Dept: URBAN - METROPOLITAN AREA CLINIC 122 | Facility: CLINIC | Age: 83
Setting detail: OPHTHALMOLOGY
End: 2023-11-27
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
## 2023-11-27 PROCEDURE — MDRCRDRCRD MEDICAL RECORDS: Performed by: SPECIALIST

## 2024-02-01 NOTE — PHYSICAL THERAPY INITIAL EVALUATION ADULT - GROSSLY INTACT, SENSORY
PROVIDER:[TOKEN:[[id number]],FOLLOWUP:[1 week],ESTABLISHEDPATIENT:[T]] Left UE/Right UE/Left LE/Right LE/Grossly Intact

## 2024-04-05 ENCOUNTER — OFFICE (OUTPATIENT)
Dept: URBAN - METROPOLITAN AREA CLINIC 122 | Facility: CLINIC | Age: 84
Setting detail: OPHTHALMOLOGY
End: 2024-04-05
Payer: MEDICARE

## 2024-04-05 DIAGNOSIS — H25.12: ICD-10-CM

## 2024-04-05 DIAGNOSIS — H02.825: ICD-10-CM

## 2024-04-05 DIAGNOSIS — H02.822: ICD-10-CM

## 2024-04-05 DIAGNOSIS — Z96.1: ICD-10-CM

## 2024-04-05 DIAGNOSIS — G43.109: ICD-10-CM

## 2024-04-05 DIAGNOSIS — H16.223: ICD-10-CM

## 2024-04-05 PROCEDURE — 92012 INTRM OPH EXAM EST PATIENT: CPT | Performed by: OPHTHALMOLOGY

## 2024-07-19 ENCOUNTER — OFFICE (OUTPATIENT)
Dept: URBAN - METROPOLITAN AREA CLINIC 122 | Facility: CLINIC | Age: 84
Setting detail: OPHTHALMOLOGY
End: 2024-07-19
Payer: MEDICARE

## 2024-07-19 DIAGNOSIS — H16.223: ICD-10-CM

## 2024-07-19 DIAGNOSIS — H25.12: ICD-10-CM

## 2024-07-19 DIAGNOSIS — G43.109: ICD-10-CM

## 2024-07-19 DIAGNOSIS — Z96.1: ICD-10-CM

## 2024-07-19 DIAGNOSIS — H02.822: ICD-10-CM

## 2024-07-19 DIAGNOSIS — H43.393: ICD-10-CM

## 2024-07-19 DIAGNOSIS — H02.825: ICD-10-CM

## 2024-07-19 DIAGNOSIS — H35.033: ICD-10-CM

## 2024-07-19 PROCEDURE — 92014 COMPRE OPH EXAM EST PT 1/>: CPT | Performed by: OPHTHALMOLOGY

## 2024-07-19 PROCEDURE — 92250 FUNDUS PHOTOGRAPHY W/I&R: CPT | Performed by: OPHTHALMOLOGY

## 2024-09-02 ENCOUNTER — NON-APPOINTMENT (OUTPATIENT)
Age: 84
End: 2024-09-02

## 2024-09-18 NOTE — END OF VISIT
[Time Spent: ___ minutes] : I have spent [unfilled] minutes of time on the encounter which excludes teaching and separately reported services. Detail Level: Simple

## 2024-09-24 ENCOUNTER — APPOINTMENT (OUTPATIENT)
Dept: BREAST CENTER | Facility: CLINIC | Age: 84
End: 2024-09-24
Payer: MEDICARE

## 2024-09-24 VITALS
WEIGHT: 200 LBS | HEART RATE: 75 BPM | DIASTOLIC BLOOD PRESSURE: 63 MMHG | BODY MASS INDEX: 36.8 KG/M2 | HEIGHT: 62 IN | SYSTOLIC BLOOD PRESSURE: 117 MMHG

## 2024-09-24 DIAGNOSIS — Z00.00 ENCOUNTER FOR GENERAL ADULT MEDICAL EXAMINATION W/OUT ABNORMAL FINDINGS: ICD-10-CM

## 2024-09-24 DIAGNOSIS — C50.011 MALIGNANT NEOPLASM OF NIPPLE AND AREOLA, RIGHT FEMALE BREAST: ICD-10-CM

## 2024-09-24 DIAGNOSIS — R92.2 INCONCLUSIVE MAMMOGRAM: ICD-10-CM

## 2024-09-24 DIAGNOSIS — N60.19 DIFFUSE CYSTIC MASTOPATHY OF UNSPECIFIED BREAST: ICD-10-CM

## 2024-09-24 DIAGNOSIS — R92.30 INCONCLUSIVE MAMMOGRAM: ICD-10-CM

## 2024-09-24 DIAGNOSIS — M79.621 PAIN IN RIGHT UPPER ARM: ICD-10-CM

## 2024-09-24 DIAGNOSIS — Z92.21 PERSONAL HISTORY OF ANTINEOPLASTIC CHEMOTHERAPY: ICD-10-CM

## 2024-09-24 PROCEDURE — 99213 OFFICE O/P EST LOW 20 MIN: CPT

## 2024-09-24 RX ORDER — HYDROCHLOROTHIAZIDE 12.5 MG/1
TABLET ORAL
Refills: 0 | Status: ACTIVE | COMMUNITY

## 2024-09-24 RX ORDER — MULTIVITAMIN
CAPSULE ORAL
Refills: 0 | Status: ACTIVE | COMMUNITY

## 2024-09-24 RX ORDER — MELOXICAM 15 MG/1
15 TABLET ORAL
Refills: 0 | Status: ACTIVE | COMMUNITY

## 2024-09-24 NOTE — HISTORY OF PRESENT ILLNESS
[FreeTextEntry1] : This is a 84 year old female s/p right axillary dissection on 08/18/2017. Pathology showed negative for metastasis in 0/4 lymph nodes, no residual carcinoma identified. Patient is s/p right PMX and right excisional biopsy on 07/13/2017. 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 Patient is s/p right breast MRI guided biopsy on 06/19/2017. Pathology showed right breast outer: mildly dilated ducts in a background of predominantly adipocytic mammary parenchyma with patchy stromal fibrosis, right breast retro: small duct papillomata; duct ectasia; sparse intraluminal calcifications; stromal sclerosis (fibrosis). Patient is s/p right nipple punch biopsy on 06/14/2017. 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 Breast triple stain shows cytokeratin 8/18 positive epithelial cells without surrounding myoepithelial cells (CK5 an P63 negative). Patient originally referred by Dr. Ty for a right nipple inversion.  Patient completed six months of IV CMF (Dr. Ortiz) in 8/2019. Patient was last seen by Dr. Ortiz on 9/10/2020 . Patient is s/p  radiation therapy on  4/20/2018(Dr. Siegel).  She started anastrozole therapy on 5/11/2018. She has c/o of nausea when she is tired since she started it. She also has c/o of insomnia and leg cramps since the anastrozole.  She has taken Zofran for these symptoms with relief.   Patient is s/p left breast usd guided biopsy (heart shaped clip) on 01/26/2018. Pathology showed left retroareolar breast demonstrated focal UDH, duct ectasia with luminal histiocytes and periductal fibrosis sclerosis, apocrine cysts. Patient states the  right arm numbness is improving . Patient c/o  of right breast pain is improving.   She fell in June 2018 at her home in the front yard. She sustained some pain to her right sided thorax. But did not break anything or have any injuries.  She completed a CMF chemotherapy on January 2018. She completed radiation therapy on April 20, 2018. A total of 6040 cGy was delivered to the right breast tumor bed. 2 weeks ago she noted to have right arm and breast swelling. She saw Dr. Ortiz and was referred for physical therapy. She has been seeing [facility] for the past 3 months. She c/o right breast decreased  strength and feels her fingers on her right hand feels "weird" and this sensation wasn't there before. She mentioned to her PT.  She is on Arimidex which was recalled and she is now on letrozole for past 2-3 weeks ago. She saw Dr. Cali and was told to follow up with Dr. Ortiz regarding AI toxicity. Her  passed 2/2020.  She was found to have rib fx on right on recent CT chest 9/2020. She had a cardiac cath with Dr. Gallego she had a slight blockage 30% and has been feeling better since the cath.   She had call back imaging today and needs right breast stereotactic bx. She has some depression related to her husbands loss.  She is s/p right breast stereotactic biopsy (aruna clip) on 9/24/2021, pathology showed benign breast tissue with dense fibrous scar associated with calcifications.  She had a left knee patellar fx post fall at the [facility]. She had TAVR at [facility] 5/2022. She had a fall at the [facility] a few weeks ago. Her right arm/underarm pain/tingling and swelling has improved from prior.  She does SBE.  She has not noticed a change in her breast or any left breast lump. Patient noticed right axillary lumpiness. She has noticed a change in her nipple or nipple area.  She has  noticed a change in the skin of the breast since radiation. She is not experiencing nipple discharge. She is not experiencing any left breast pain.  She has not noticed a lump or lymph node under the armpit. She c/o right lumpiness in armpit and pain.  BREAST CANCER RISK FACTORS Menarche: 11 Menopause: 44 Grav: 5     Para:  4 Age at first live birth: 20 Nursed: no Hysterectomy: no  Oophorectomy: no OCP: yes  on and off for approx 10-15 years HRT: no Last pap/pelvic exam: 4/2017 WNL Related family history:  mother BCA@55 Ashkenazi: no Mastery risk assessment:  BRCA 2.2%, TCv6 6.1%, TCv7 4.7%, Shannon 5.36%, Kirill 0.3% BRCA testing: no Bra size: 42B  Last mammogram: 8/27/2024                 Location: [facility] Report reviewed.                                   Images reviewed on PACS Results: Birads 2 No evidence of malignancy.  There are scattered areas of fibroglandular density.  Last ultrasound: 8/27/2024                     Location: [facility] Report reviewed.                                           Images reviewed.  Results: Birads 2 No suspicious finding.   Last MRI:    06/12/2017                        Location: [facility] Report reviewed.                                 Images reviewed.  Results: Birads 4 Right breast: Nipple retraction with intense enhancement of the retracted nipple. Branching linear enhancement at 11:00 and a focus of nodular enhancement at 9:00; MR guided biopsy recommended.  Repeat targeted usd of the breast shows a focus of retroareolar ductal dilatation and a probable mildly complicated subcutaneous cyst measuring 3mm at 10:00.  Left breast: No evidence of malignancy

## 2024-09-24 NOTE — HISTORY OF PRESENT ILLNESS
[FreeTextEntry1] : This is a 84 year old female s/p right axillary dissection on 08/18/2017. Pathology showed negative for metastasis in 0/4 lymph nodes, no residual carcinoma identified. Patient is s/p right PMX and right excisional biopsy on 07/13/2017. 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 Patient is s/p right breast MRI guided biopsy on 06/19/2017. Pathology showed right breast outer: mildly dilated ducts in a background of predominantly adipocytic mammary parenchyma with patchy stromal fibrosis, right breast retro: small duct papillomata; duct ectasia; sparse intraluminal calcifications; stromal sclerosis (fibrosis). Patient is s/p right nipple punch biopsy on 06/14/2017. 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 Breast triple stain shows cytokeratin 8/18 positive epithelial cells without surrounding myoepithelial cells (CK5 an P63 negative). Patient originally referred by Dr. Ty for a right nipple inversion.  Patient completed six months of IV CMF (Dr. Ortiz) in 8/2019. Patient was last seen by Dr. Ortiz on 9/10/2020 . Patient is s/p  radiation therapy on  4/20/2018(Dr. Siegel).  She started anastrozole therapy on 5/11/2018. She has c/o of nausea when she is tired since she started it. She also has c/o of insomnia and leg cramps since the anastrozole.  She has taken Zofran for these symptoms with relief.   Patient is s/p left breast usd guided biopsy (heart shaped clip) on 01/26/2018. Pathology showed left retroareolar breast demonstrated focal UDH, duct ectasia with luminal histiocytes and periductal fibrosis sclerosis, apocrine cysts. Patient states the  right arm numbness is improving . Patient c/o  of right breast pain is improving.   She fell in June 2018 at her home in the front yard. She sustained some pain to her right sided thorax. But did not break anything or have any injuries.  She completed a CMF chemotherapy on January 2018. She completed radiation therapy on April 20, 2018. A total of 6040 cGy was delivered to the right breast tumor bed. 2 weeks ago she noted to have right arm and breast swelling. She saw Dr. Ortiz and was referred for physical therapy. She has been seeing [facility] for the past 3 months. She c/o right breast decreased  strength and feels her fingers on her right hand feels "weird" and this sensation wasn't there before. She mentioned to her PT.  She is on Arimidex which was recalled and she is now on letrozole for past 2-3 weeks ago. She saw Dr. Cali and was told to follow up with Dr. Ortiz regarding AI toxicity. Her  passed 2/2020.  She was found to have rib fx on right on recent CT chest 9/2020. She had a cardiac cath with Dr. Gallego she had a slight blockage 30% and has been feeling better since the cath.   She had call back imaging today and needs right breast stereotactic bx. She has some depression related to her husbands loss.  She is s/p right breast stereotactic biopsy (aruna clip) on 9/24/2021, pathology showed benign breast tissue with dense fibrous scar associated with calcifications.  She had a left knee patellar fx post fall at the [place] [employer] [facility]. She had [facility] 5/[facility]. She had a fall at the [facility] a few weeks ago. Her right arm/underarm pain/tingling and swelling has improved from prior.  She does SBE.  She has not noticed a change in her breast or any left breast lump. Patient noticed right axillary lumpiness. She has noticed a change in her nipple or nipple area.  She has  noticed a change in the skin of the breast since radiation. She is not experiencing nipple discharge. She is not experiencing any left breast pain.  She has not noticed a lump or lymph node under the armpit. She c/o right lumpiness in armpit and pain.  BREAST CANCER RISK FACTORS Menarche: 11 Menopause: 44 Grav: 5     Para:  4 Age at first live birth: 20 Nursed: no Hysterectomy: no  Oophorectomy: no OCP: yes  on and off for approx 10-15 years HRT: no Last pap/pelvic exam: 4/2017 WNL Related family history:  mother BCA@55 Ashkenazi: no Mastery risk assessment:  BRCA 2.2%, TCv6 6.1%, TCv7 4.7%, Shannon 5.36%, Kirill 0.3% BRCA testing: no Bra size: 42B  Last mammogram: 8/27/2024                 Location: [facility] Report reviewed.                                   Images reviewed on PACS Results: Birads 2 No evidence of malignancy.  There are scattered areas of fibroglandular density.  Last ultrasound: 8/27/2024                     Location: [facility] Report reviewed.                                           Images reviewed.  Results: Birads 2 No suspicious finding.   Last MRI:    06/12/2017                        Location: [facility] Report reviewed.                                 Images reviewed.  Results: Birads 4 Right breast: Nipple retraction with intense enhancement of the retracted nipple. Branching linear enhancement at 11:00 and a focus of nodular enhancement at 9:00; MR guided biopsy recommended.  Repeat targeted usd of the breast shows a focus of retroareolar ductal dilatation and a probable mildly complicated subcutaneous cyst measuring 3mm at 10:00.  Left breast: No evidence of malignancy

## 2024-09-24 NOTE — CONSULT LETTER
[Dear  ___] : Dear ~MICAELA, [Courtesy Letter:] : I had the pleasure of seeing your patient, [unfilled], in my office today. [Please see my note below.] : Please see my note below. [Consult Closing:] : Thank you very much for allowing me to participate in the care of this patient.  If you have any questions, please do not hesitate to contact me. [Sincerely,] : Sincerely, [FreeTextEntry3] : Candi [place] [address]

## 2024-09-24 NOTE — CONSULT LETTER
[Dear  ___] : Dear ~MICAELA, [Courtesy Letter:] : I had the pleasure of seeing your patient, [unfilled], in my office today. [Please see my note below.] : Please see my note below. [Consult Closing:] : Thank you very much for allowing me to participate in the care of this patient.  If you have any questions, please do not hesitate to contact me. [Sincerely,] : Sincerely, [FreeTextEntry3] : [place] [address]

## 2024-09-24 NOTE — HISTORY OF PRESENT ILLNESS
[FreeTextEntry1] : This is a 84 year old female s/p right axillary dissection on 08/18/2017. Pathology showed negative for metastasis in 0/4 lymph nodes, no residual carcinoma identified. Patient is s/p right PMX and right excisional biopsy on 07/13/2017. 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 Patient is s/p right breast MRI guided biopsy on 06/19/2017. Pathology showed right breast outer: mildly dilated ducts in a background of predominantly adipocytic mammary parenchyma with patchy stromal fibrosis, right breast retro: small duct papillomata; duct ectasia; sparse intraluminal calcifications; stromal sclerosis (fibrosis). Patient is s/p right nipple punch biopsy on 06/14/2017. 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 Breast triple stain shows cytokeratin 8/18 positive epithelial cells without surrounding myoepithelial cells (CK5 an P63 negative). Patient originally referred by Dr. Ty for a right nipple inversion.  Patient completed six months of IV CMF (Dr. Ortiz) in 8/2019. Patient was last seen by Dr. Ortiz on 9/10/2020 . Patient is s/p  radiation therapy on  4/20/2018(Dr. Siegel).  She started anastrozole therapy on 5/11/2018. She has c/o of nausea when she is tired since she started it. She also has c/o of insomnia and leg cramps since the anastrozole.  She has taken Zofran for these symptoms with relief.   Patient is s/p left breast usd guided biopsy (heart shaped clip) on 01/26/2018. Pathology showed left retroareolar breast demonstrated focal UDH, duct ectasia with luminal histiocytes and periductal fibrosis sclerosis, apocrine cysts. Patient states the  right arm numbness is improving . Patient c/o  of right breast pain is improving.   She fell in June 2018 at her home in the front yard. She sustained some pain to her right sided thorax. But did not break anything or have any injuries.  She completed a CMF chemotherapy on January 2018. She completed radiation therapy on April 20, 2018. A total of 6040 cGy was delivered to the right breast tumor bed. 2 weeks ago she noted to have right arm and breast swelling. She saw Dr. Ortiz and was referred for physical therapy. She has been seeing [facility] for the past 3 months. She c/o right breast decreased  strength and feels her fingers on her right hand feels "weird" and this sensation wasn't there before. She mentioned to her PT.  She is on Arimidex which was recalled and she is now on letrozole for past 2-3 weeks ago. She saw Dr. Cali and was told to follow up with Dr. Ortiz regarding AI toxicity. Her  passed 2/2020.  She was found to have rib fx on right on recent CT chest 9/2020. She had a cardiac cath with Dr. Gallego she had a slight blockage 30% and has been feeling better since the cath.   She had call back imaging today and needs right breast stereotactic bx. She has some depression related to her husbands loss.  She is s/p right breast stereotactic biopsy (aruna clip) on 9/24/2021, pathology showed benign breast tissue with dense fibrous scar associated with calcifications.  She had a left knee patellar fx post fall at the [facility] [employer] [employer] [place]. She had TAVR at [facility] 5/20[facility]. She had a fall at the [facility] a few weeks ago. Her right arm/underarm pain/tingling and swelling has improved from prior.  She does SBE.  She has not noticed a change in her breast or any left breast lump. Patient noticed right axillary lumpiness. She has noticed a change in her nipple or nipple area.  She has  noticed a change in the skin of the breast since radiation. She is not experiencing nipple discharge. She is not experiencing any left breast pain.  She has not noticed a lump or lymph node under the armpit. She c/o right lumpiness in armpit and pain.  BREAST CANCER RISK FACTORS Menarche: 11 Menopause: 44 Grav: 5     Para:  4 Age at first live birth: 20 Nursed: no Hysterectomy: no  Oophorectomy: no OCP: yes  on and off for approx 10-15 years HRT: no Last pap/pelvic exam: 4/2017 WNL Related family history:  mother BCA@55 Ashkenazi: no Mastery risk assessment:  BRCA 2.2%, TCv6 6.1%, TCv7 4.7%, Shannon 5.36%, Kirill 0.3% BRCA testing: no Bra size: 42B  Last mammogram: 8/27/2024                 Location: [place] Report reviewed.                                   Images reviewed on PACS Results: Birads 2 No evidence of malignancy.  There are scattered areas of fibroglandular density.  Last ultrasound: 8/27/2024                     Location: [facility] Report reviewed.                                           Images reviewed.  Results: Birads 2 No suspicious finding.   Last MRI:    06/12/2017                        Location: [facility] Report reviewed.                                 Images reviewed.  Results: Birads 4 Right breast: Nipple retraction with intense enhancement of the retracted nipple. Branching linear enhancement at 11:00 and a focus of nodular enhancement at 9:00; MR guided biopsy recommended.  Repeat targeted usd of the breast shows a focus of retroareolar ductal dilatation and a probable mildly complicated subcutaneous cyst measuring 3mm at 10:00.  Left breast: No evidence of malignancy

## 2024-09-24 NOTE — CONSULT LETTER
[Dear  ___] : Dear ~MICAELA, [Courtesy Letter:] : I had the pleasure of seeing your patient, [unfilled], in my office today. [Please see my note below.] : Please see my note below. [Consult Closing:] : Thank you very much for allowing me to participate in the care of this patient.  If you have any questions, please do not hesitate to contact me. [Sincerely,] : Sincerely, [FreeTextEntry3] : Candi [place] [address]

## 2024-09-24 NOTE — PHYSICAL EXAM
[Normocephalic] : normocephalic [Atraumatic] : atraumatic [Supple] : supple [No Supraclavicular Adenopathy] : no supraclavicular adenopathy [Examined in the supine and seated position] : examined in the supine and seated position [Symmetrical] : symmetrical [No dominant masses] : no dominant masses in right breast  [No dominant masses] : no dominant masses left breast [No Nipple Retraction] : no left nipple retraction [No Nipple Discharge] : no left nipple discharge [Breast Mass Left Breast ___cm] : no masses [No Axillary Lymphadenopathy] : no left axillary lymphadenopathy [No Edema] : no edema [No Swelling] : no swelling [Full ROM] : full range of motion [No Rashes] : no rashes [No Ulceration] : no ulceration [de-identified] : s/p PMx, 3-4cm firm area of fat necrosis, stable without change central incision, mild brawny induration c/w RTx,  stable from prior [de-identified] : healed axillary incision,no arm swelling, no drainage, motor function intact, no redness [de-identified] :  strength is equal bilaterally

## 2024-09-24 NOTE — PHYSICAL EXAM
[Normocephalic] : normocephalic [Atraumatic] : atraumatic [Supple] : supple [No Supraclavicular Adenopathy] : no supraclavicular adenopathy [Examined in the supine and seated position] : examined in the supine and seated position [Symmetrical] : symmetrical [No dominant masses] : no dominant masses in right breast  [No dominant masses] : no dominant masses left breast [No Nipple Retraction] : no left nipple retraction [No Nipple Discharge] : no left nipple discharge [Breast Mass Left Breast ___cm] : no masses [No Axillary Lymphadenopathy] : no left axillary lymphadenopathy [No Edema] : no edema [No Swelling] : no swelling [Full ROM] : full range of motion [No Rashes] : no rashes [No Ulceration] : no ulceration [de-identified] : s/p PMx, 3-4cm firm area of fat necrosis, stable without change central incision, mild brawny induration c/w RTx,  stable from prior [de-identified] : healed axillary incision,no arm swelling, no drainage, motor function intact, no redness [de-identified] :  strength is equal bilaterally

## 2024-09-24 NOTE — PHYSICAL EXAM
[Normocephalic] : normocephalic [Atraumatic] : atraumatic [Supple] : supple [No Supraclavicular Adenopathy] : no supraclavicular adenopathy [Examined in the supine and seated position] : examined in the supine and seated position [Symmetrical] : symmetrical [No dominant masses] : no dominant masses in right breast  [No dominant masses] : no dominant masses left breast [No Nipple Retraction] : no left nipple retraction [No Nipple Discharge] : no left nipple discharge [Breast Mass Left Breast ___cm] : no masses [No Axillary Lymphadenopathy] : no left axillary lymphadenopathy [No Edema] : no edema [No Swelling] : no swelling [Full ROM] : full range of motion [No Rashes] : no rashes [No Ulceration] : no ulceration [de-identified] : s/p PMx, 3-4cm firm area of fat necrosis, stable without change central incision, mild brawny induration c/w RTx,  stable from prior [de-identified] : healed axillary incision,no arm swelling, no drainage, motor function intact, no redness [de-identified] :  strength is equal bilaterally

## 2024-09-24 NOTE — ASSESSMENT
[FreeTextEntry1] : 84 year-old female followed for right breast cancer stage IIA, TURNER plan bilateral mammogram 8/2025 Continue AI and surveillance per Dr. Ortiz Recommend topical vitamin E topical therapy to right breast central prn not recommending breast MRI at this time f/u 1 year She knows to call or return sooner should any concerns or questions arise.

## 2024-12-03 ENCOUNTER — OFFICE (OUTPATIENT)
Dept: URBAN - METROPOLITAN AREA CLINIC 122 | Facility: CLINIC | Age: 84
Setting detail: OPHTHALMOLOGY
End: 2024-12-03
Payer: MEDICARE

## 2024-12-03 DIAGNOSIS — H00.022: ICD-10-CM

## 2024-12-03 PROCEDURE — 99213 OFFICE O/P EST LOW 20 MIN: CPT

## 2024-12-03 ASSESSMENT — TONOMETRY
OS_IOP_MMHG: 15
OD_IOP_MMHG: 15

## 2024-12-03 ASSESSMENT — REFRACTION_AUTOREFRACTION
OS_AXIS: 175
OS_CYLINDER: -1.25
OD_CYLINDER: -2.25
OD_AXIS: 6
OS_SPHERE: +0.50
OD_SPHERE: +1.00

## 2024-12-03 ASSESSMENT — REFRACTION_MANIFEST
OD_CYLINDER: -1.25
OS_CYLINDER: -1.50
OD_VA1: 20/20
OD_ADD: +2.50
OD_CYLINDER: -2.00
OS_AXIS: 180
OS_CYLINDER: -1.00
OD_AXIS: 165
OD_VA1: 20/25
OS_SPHERE: +0.75
OS_SPHERE: +1.25
OS_ADD: +2.50
OS_AXIS: 180
OD_SPHERE: +1.25
OD_ADD: +2.75
OS_VA1: 20/20
OS_VA1: 20/25
OD_SPHERE: +1.00
OD_AXIS: 175
OS_ADD: +2.75

## 2024-12-03 ASSESSMENT — REFRACTION_CURRENTRX
OD_OVR_VA: 20/
OD_AXIS: 176
OS_AXIS: 180
OS_ADD: +2.75
OS_CYLINDER: -1.50
OD_AXIS: 175
OD_SPHERE: +1.00
OD_ADD: +2.75
OD_CYLINDER: -2.00
OS_VPRISM_DIRECTION: PROGS
OS_OVR_VA: 20/
OS_OVR_VA: 20/
OS_ADD: +2.75
OS_AXIS: 180
OD_VPRISM_DIRECTION: PROGS
OS_SPHERE: +0.75
OS_CYLINDER: -1.50
OS_SPHERE: +0.75
OD_CYLINDER: -2.00
OD_SPHERE: +1.00
OD_OVR_VA: 20/
OD_ADD: +2.75

## 2024-12-03 ASSESSMENT — VISUAL ACUITY
OS_BCVA: 20/30
OD_BCVA: 20/40-2

## 2024-12-03 ASSESSMENT — TEAR BREAK UP TIME (TBUT)
OS_TBUT: 2+
OD_TBUT: 2+

## 2024-12-03 ASSESSMENT — CONFRONTATIONAL VISUAL FIELD TEST (CVF)
OS_FINDINGS: FULL
OD_FINDINGS: FULL

## 2025-01-14 ENCOUNTER — OFFICE (OUTPATIENT)
Dept: URBAN - METROPOLITAN AREA CLINIC 122 | Facility: CLINIC | Age: 85
Setting detail: OPHTHALMOLOGY
End: 2025-01-14
Payer: MEDICARE

## 2025-01-14 DIAGNOSIS — H01.005: ICD-10-CM

## 2025-01-14 DIAGNOSIS — H01.001: ICD-10-CM

## 2025-01-14 DIAGNOSIS — H16.223: ICD-10-CM

## 2025-01-14 DIAGNOSIS — H01.004: ICD-10-CM

## 2025-01-14 DIAGNOSIS — H01.002: ICD-10-CM

## 2025-01-14 PROCEDURE — 99213 OFFICE O/P EST LOW 20 MIN: CPT

## 2025-01-14 ASSESSMENT — REFRACTION_MANIFEST
OS_AXIS: 180
OD_VA1: 20/25
OD_CYLINDER: -1.25
OS_VA1: 20/25
OD_SPHERE: +1.25
OS_ADD: +2.50
OS_SPHERE: +1.25
OD_VA1: 20/20
OS_ADD: +2.75
OS_AXIS: 180
OD_ADD: +2.50
OS_CYLINDER: -1.00
OD_ADD: +2.75
OD_CYLINDER: -2.00
OS_CYLINDER: -1.50
OD_AXIS: 165
OS_VA1: 20/20
OD_AXIS: 175
OD_SPHERE: +1.00
OS_SPHERE: +0.75

## 2025-01-14 ASSESSMENT — TEAR BREAK UP TIME (TBUT)
OS_TBUT: 2+
OD_TBUT: 2+

## 2025-01-14 ASSESSMENT — REFRACTION_CURRENTRX
OS_AXIS: 180
OD_SPHERE: +1.00
OS_VPRISM_DIRECTION: PROGS
OS_ADD: +2.75
OD_VPRISM_DIRECTION: PROGS
OD_CYLINDER: -2.00
OD_CYLINDER: -2.00
OS_CYLINDER: -1.50
OS_SPHERE: +0.75
OD_OVR_VA: 20/
OD_SPHERE: +1.00
OD_ADD: +2.75
OD_AXIS: 176
OS_CYLINDER: -1.50
OS_AXIS: 180
OS_OVR_VA: 20/
OD_ADD: +2.75
OS_OVR_VA: 20/
OD_AXIS: 175
OS_SPHERE: +0.75
OD_OVR_VA: 20/
OS_ADD: +2.75

## 2025-01-14 ASSESSMENT — REFRACTION_AUTOREFRACTION
OD_AXIS: 6
OD_CYLINDER: -2.25
OD_SPHERE: +1.00
OS_SPHERE: +0.50
OS_AXIS: 175
OS_CYLINDER: -1.25

## 2025-01-14 ASSESSMENT — CORNEAL TRAUMA - FOREIGN BODY: OD_FOREIGNBODY: ABSENT

## 2025-01-14 ASSESSMENT — CONFRONTATIONAL VISUAL FIELD TEST (CVF)
OD_FINDINGS: FULL
OS_FINDINGS: FULL

## 2025-01-14 ASSESSMENT — LID EXAM ASSESSMENTS
OD_DERMATOCHALASIS: 2+
OS_DERMATOCHALASIS: 2+
OS_BLEPHARITIS: LLL LUL 1+
OD_BLEPHARITIS: RLL RUL 1+

## 2025-01-14 ASSESSMENT — VISUAL ACUITY
OS_BCVA: 20/30
OD_BCVA: 20/30-2

## 2025-01-14 ASSESSMENT — CORNEAL TRAUMA - ABRASION: OD_ABRASION: ABSENT

## 2025-01-14 ASSESSMENT — TONOMETRY
OS_IOP_MMHG: 17
OD_IOP_MMHG: 17

## 2025-04-29 ENCOUNTER — OFFICE (OUTPATIENT)
Dept: URBAN - METROPOLITAN AREA CLINIC 122 | Facility: CLINIC | Age: 85
Setting detail: OPHTHALMOLOGY
End: 2025-04-29
Payer: MEDICARE

## 2025-04-29 DIAGNOSIS — H16.223: ICD-10-CM

## 2025-04-29 DIAGNOSIS — H01.002: ICD-10-CM

## 2025-04-29 DIAGNOSIS — H01.004: ICD-10-CM

## 2025-04-29 DIAGNOSIS — H52.7: ICD-10-CM

## 2025-04-29 DIAGNOSIS — H01.001: ICD-10-CM

## 2025-04-29 DIAGNOSIS — H01.005: ICD-10-CM

## 2025-04-29 DIAGNOSIS — H25.12: ICD-10-CM

## 2025-04-29 PROCEDURE — 92012 INTRM OPH EXAM EST PATIENT: CPT

## 2025-04-29 PROCEDURE — 92015 DETERMINE REFRACTIVE STATE: CPT

## 2025-04-29 ASSESSMENT — TEAR BREAK UP TIME (TBUT)
OS_TBUT: 2+
OD_TBUT: 2+

## 2025-04-29 ASSESSMENT — REFRACTION_MANIFEST
OS_ADD: +2.75
OD_CYLINDER: -2.00
OS_CYLINDER: -1.50
OS_SPHERE: +0.75
OD_CYLINDER: -1.25
OS_VA1: 20/25
OD_SPHERE: +1.25
OD_VA1: 20/20
OS_CYLINDER: -1.00
OS_ADD: +2.50
OS_VA1: 20/20
OS_SPHERE: +1.25
OD_AXIS: 175
OD_AXIS: 010
OS_ADD: +2.50
OD_SPHERE: +1.00
OD_ADD: +2.50
OS_AXIS: 180
OD_AXIS: 165
OD_VA1: 20/20
OD_SPHERE: +1.25
OD_VA1: 20/25
OS_VA1: 20/20-
OS_AXIS: 150
OD_ADD: +2.75
OS_SPHERE: +0.50
OD_ADD: +2.50
OS_CYLINDER: -0.75
OU_VA: 20/20
OS_AXIS: 180
OD_CYLINDER: -2.00

## 2025-04-29 ASSESSMENT — CORNEAL TRAUMA - FOREIGN BODY: OD_FOREIGNBODY: ABSENT

## 2025-04-29 ASSESSMENT — REFRACTION_AUTOREFRACTION
OD_AXIS: 10
OD_CYLINDER: -2.75
OS_SPHERE: +0.50
OD_SPHERE: +1.25
OS_AXIS: 149
OS_CYLINDER: -0.75

## 2025-04-29 ASSESSMENT — TONOMETRY
OD_IOP_MMHG: 14
OS_IOP_MMHG: 15

## 2025-04-29 ASSESSMENT — VISUAL ACUITY
OS_BCVA: 20/30
OD_BCVA: 20/40-2

## 2025-04-29 ASSESSMENT — LID EXAM ASSESSMENTS
OD_BLEPHARITIS: RLL RUL 1+
OS_BLEPHARITIS: LLL LUL 1+
OD_DERMATOCHALASIS: 2+
OS_DERMATOCHALASIS: 2+

## 2025-04-29 ASSESSMENT — REFRACTION_CURRENTRX
OD_OVR_VA: 20/
OD_AXIS: 176
OS_OVR_VA: 20/
OS_ADD: +2.75
OD_SPHERE: +1.00
OD_CYLINDER: -2.00
OS_ADD: +2.75
OD_VPRISM_DIRECTION: PROGS
OD_CYLINDER: -2.00
OD_SPHERE: +1.00
OD_AXIS: 175
OS_SPHERE: +0.75
OD_ADD: +2.75
OS_SPHERE: +0.75
OS_VPRISM_DIRECTION: PROGS
OS_CYLINDER: -1.50
OS_CYLINDER: -1.50
OS_OVR_VA: 20/
OS_AXIS: 180
OD_OVR_VA: 20/
OS_AXIS: 180
OD_ADD: +2.75

## 2025-04-29 ASSESSMENT — CORNEAL TRAUMA - ABRASION: OD_ABRASION: ABSENT

## 2025-05-22 NOTE — HISTORY OF PRESENT ILLNESS
Patient here with c/o right facial tingling/burning sensation, intermittent \"for some time\", also right sided weakness \"on and off for a while\", states she had a stroke in the past and has right residual weakness and she is not sure if its from her previous stroke. No speech changes, face symmetrical. No n/v/d/fever. Placed n gown for MD pimentel. Vitals updated.   
[FreeTextEntry1] : This is a 80 year old female s/p right axillary dissection on 08/18/2017. Pathology showed negative for metastasis in 0/4 lymph nodes, no residual carcinoma identified. Patient is s/p right PMX and right excisional biopsy on 07/13/2017. 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 Patient is s/p right breast MRI guided biopsy on 06/19/2017. Pathology showed right breast outer: mildly dilated ducts in a background of predominantly adipocytic mammary parenchyma with patchy stromal fibrosis, right breast retro: small duct papillomata; duct ectasia; sparse intraluminal calcifications; stromal sclerosis (fibrosis). Patient is s/p right nipple punch biopsy on 06/14/2017. Pathology showed right nipple IDC, grade 5/9, up to 3mm, suspicious for dermal lymphatic invasion, ER/MI positive, HER2 negative, Ki67 low. Breast triple stain shows cytokeratin 8/18 positive epithelial cells without surrounding myoepithelial cells (CK5 an P63 negative). Patient originally referred by Dr. Ty for a right nipple inversion.\par \par Patient completed six months of IV CMF (Dr. Ortiz) in 8/2019. Patient was last seen by Dr. Ortiz on 05/10/2018 . Patient is s/p  radiation therapy on  4/20/2018(Dr. Siegel).  She started anastrozole therapy on 5/11/2018. She has c/o of nausea when she is tired since she started it. She also has c/o of insomnia and leg cramps since the anastrozole.  She has taken Zofran for these symptoms with relief. \par \par Patient is s/p left breast usd guided biopsy (heart shaped clip) on 01/26/2018. Pathology showed left retroareolar breast demonstrated focal UDH, duct ectasia with luminal histiocytes and periductal fibrosis sclerosis, apocrine cysts. Patient states the  right arm numbness is improving . Patient c/o  of right breast pain is improving.  The right breast lump  is unchanged. \par She fell in June 2018 at her home in the front yard. She sustained some pain to her right sided thorax. But did not break anything or have any injuries.\par \par She completed a CMF chemotherapy on January 2018. She completed radiation therapy on April 20, 2018. A total of 6040 cGy was delivered to the right breast tumor bed. 2 weeks ago she noted to have right arm and breast swelling. She saw Dr. Ortiz and was referred for physical therapy. She has been seeing [facility] for the past 3 months. She c/o right breast decreased  strength and feels her fingers on her right hand feels "weird" and this sensation wasn't there before. She mentioned to her PT.\par \par She is on Arimidex which was recalled and she is now on letrozole for past 2-3 weeks ago. She saw Dr. Cali and was told to follow up with Dr. Ortiz regarding AI toxicity. Her  passed last week.\par \par She does SBE. \par She has not noticed a change in her breast or any left breast lump. Patient noticed right breast lump. \par She has noticed a change in her nipple or nipple area. \par She has  noticed a change in the skin of the breast.  \par She is not experiencing nipple discharge.\par She is not experiencing any left breast pain. \par She has not noticed a lump or lymph node under the armpit. \par \par BREAST CANCER RISK FACTORS\par Menarche: 11\par Menopause: 44\par Grav: 5     Para:  4\par Age at first live birth: 20\par Nursed: no\par Hysterectomy: no \par Oophorectomy: no\par OCP: yes  on and off for approx 10-15 years\par HRT: no\par Last pap/pelvic exam: 4/2017 WNL\par Related family history:  mother BCA@55\par Ashkenazi: no\par Mastery risk assessment:  BRCA 2.2%, TCv6 6.1%, TCv7 4.7%, Shannon 5.36%, Kirill 0.3%\par BRCA testing: no\par Bra size: 42B\par \par Last mammogram:    01/24/2019                       Location: [facility]\par Report reviewed.                                 Images reviewed on PACS\par Results: Birads 2\par Postsurgical change in the right breast. No evidence of malignancy \par \par Last ultrasound:       2/8/19     Location: [facility] \par Report reviewed.                                 Images reviewed. \par Results: Birads 2\par Left retroareolar duct with echogenic material. USD guided biopsy recommended. Done on 01/26/2018. \par \par Last MRI:    06/12/2017                                            Location: [facility]\par Report reviewed.                                 Images reviewed. \par Results: Birads 4\par Right breast: Nipple retraction with intense enhancement of the retracted nipple. Branching linear enhancement at 11:00 and a focus of nodular enhancement at 9:00; MR guided biopsy recommended. \par Repeat targeted usd of the breast shows a focus of retroareolar ductal dilatation and a probable mildly complicated subcutaneous cyst measuring 3mm at 10:00.  Left breast: No evidence of malignancy\par

## 2025-07-15 ENCOUNTER — APPOINTMENT (OUTPATIENT)
Dept: BREAST CENTER | Facility: CLINIC | Age: 85
End: 2025-07-15
Payer: MEDICARE

## 2025-07-15 VITALS
SYSTOLIC BLOOD PRESSURE: 122 MMHG | WEIGHT: 200 LBS | DIASTOLIC BLOOD PRESSURE: 60 MMHG | HEIGHT: 62 IN | HEART RATE: 71 BPM | BODY MASS INDEX: 36.8 KG/M2

## 2025-07-15 PROCEDURE — 99214 OFFICE O/P EST MOD 30 MIN: CPT

## 2025-07-15 RX ORDER — NYSTATIN AND TRIAMCINOLONE ACETONIDE 100000; 1 MG/G; MG/G
100000-0.1 CREAM TOPICAL
Qty: 60 | Refills: 2 | Status: ACTIVE | COMMUNITY
Start: 2025-07-15 | End: 1900-01-01

## 2025-07-15 RX ORDER — CYANOCOBALAMIN (VITAMIN B-12) 1000 MCG
TABLET ORAL
Refills: 0 | Status: ACTIVE | COMMUNITY

## 2025-07-15 RX ORDER — BIOTIN 10 MG
TABLET ORAL
Refills: 0 | Status: ACTIVE | COMMUNITY

## 2025-07-15 RX ORDER — FLUTICASONE PROPIONATE 50 UG/1
SPRAY NASAL
Refills: 0 | Status: ACTIVE | COMMUNITY

## 2025-07-21 ENCOUNTER — OFFICE (OUTPATIENT)
Dept: URBAN - METROPOLITAN AREA CLINIC 122 | Facility: CLINIC | Age: 85
Setting detail: OPHTHALMOLOGY
End: 2025-07-21
Payer: MEDICARE

## 2025-07-21 DIAGNOSIS — H01.002: ICD-10-CM

## 2025-07-21 DIAGNOSIS — H16.223: ICD-10-CM

## 2025-07-21 DIAGNOSIS — H52.4: ICD-10-CM

## 2025-07-21 DIAGNOSIS — H25.12: ICD-10-CM

## 2025-07-21 DIAGNOSIS — H01.004: ICD-10-CM

## 2025-07-21 DIAGNOSIS — H01.005: ICD-10-CM

## 2025-07-21 DIAGNOSIS — H35.033: ICD-10-CM

## 2025-07-21 DIAGNOSIS — H01.001: ICD-10-CM

## 2025-07-21 PROCEDURE — 92015 DETERMINE REFRACTIVE STATE: CPT | Performed by: OPHTHALMOLOGY

## 2025-07-21 PROCEDURE — 92014 COMPRE OPH EXAM EST PT 1/>: CPT | Performed by: OPHTHALMOLOGY

## 2025-07-21 PROCEDURE — 92134 CPTRZ OPH DX IMG PST SGM RTA: CPT | Performed by: OPHTHALMOLOGY

## 2025-07-21 ASSESSMENT — REFRACTION_MANIFEST
OD_VA1: 20/25
OS_ADD: +2.75
OU_VA: 20/20
OD_CYLINDER: -2.00
OS_AXIS: 180
OD_SPHERE: +1.00
OS_VA1: 20/20-
OD_AXIS: 180
OS_ADD: +2.75
OD_VA1: 20/25-2
OD_SPHERE: +1.00
OD_CYLINDER: -2.50
OS_AXIS: 150
OD_CYLINDER: -2.00
OD_ADD: +2.75
OS_SPHERE: +0.75
OS_AXIS: 150
OD_VA1: 20/20
OS_CYLINDER: -0.75
OD_ADD: +2.50
OS_CYLINDER: -0.75
OD_ADD: +2.50
OS_VA1: 20/25
OD_SPHERE: +1.25
OS_CYLINDER: -1.50
OS_VA1: 20/20-2
OD_AXIS: 10
OD_SPHERE: +1.00
OS_SPHERE: +0.75
OD_CYLINDER: -2.25
OS_CYLINDER: -0.75
OS_ADD: +2.50
OS_AXIS: 145
OD_AXIS: 010
OS_ADD: +2.50
OD_ADD: +2.75
OS_SPHERE: +0.50
OS_SPHERE: +0.50
OD_AXIS: 175

## 2025-07-21 ASSESSMENT — REFRACTION_CURRENTRX
OS_ADD: +2.75
OD_CYLINDER: -2.00
OD_VPRISM_DIRECTION: PROGS
OD_SPHERE: +1.00
OS_VPRISM_DIRECTION: PROGS
OS_AXIS: 180
OD_OVR_VA: 20/
OD_AXIS: 173
OS_OVR_VA: 20/
OS_AXIS: 180
OD_ADD: +2.75
OD_SPHERE: +1.00
OS_ADD: +2.75
OS_OVR_VA: 20/
OD_OVR_VA: 20/
OS_CYLINDER: -1.50
OD_AXIS: 176
OD_CYLINDER: -2.00
OS_CYLINDER: -1.50
OS_SPHERE: +0.75
OD_ADD: +2.75
OS_SPHERE: +0.75

## 2025-07-21 ASSESSMENT — VISUAL ACUITY
OS_BCVA: 20/25
OD_BCVA: 20/30

## 2025-07-21 ASSESSMENT — REFRACTION_AUTOREFRACTION
OD_AXIS: 09
OD_SPHERE: +1.00
OD_CYLINDER: -2.50
OS_CYLINDER: -0.75
OS_SPHERE: +0.50
OS_AXIS: 146

## 2025-07-21 ASSESSMENT — TEAR BREAK UP TIME (TBUT)
OS_TBUT: 2+
OD_TBUT: 2+

## 2025-07-21 ASSESSMENT — LID EXAM ASSESSMENTS
OS_DERMATOCHALASIS: 2+
OS_BLEPHARITIS: LLL LUL 1+
OD_DERMATOCHALASIS: 2+
OD_BLEPHARITIS: RLL RUL 1+

## 2025-07-21 ASSESSMENT — CORNEAL TRAUMA - FOREIGN BODY: OD_FOREIGNBODY: ABSENT

## 2025-07-21 ASSESSMENT — TONOMETRY
OS_IOP_MMHG: 18
OD_IOP_MMHG: 16

## 2025-07-21 ASSESSMENT — CONFRONTATIONAL VISUAL FIELD TEST (CVF)
OS_FINDINGS: FULL
OD_FINDINGS: FULL

## 2025-07-21 ASSESSMENT — CORNEAL TRAUMA - ABRASION: OD_ABRASION: ABSENT

## 2025-08-28 ENCOUNTER — RESULT REVIEW (OUTPATIENT)
Age: 85
End: 2025-08-28

## (undated) DEVICE — Device

## (undated) DEVICE — PREP SCRUB BRUSH W CHG 4%

## (undated) DEVICE — SUT TICRON 2-0 36" CV-316 DA

## (undated) DEVICE — CATH CARDIAC RT CUSET 601201319

## (undated) DEVICE — MARKING PEN W RULER

## (undated) DEVICE — CHEST DRAIN OASIS DRY SUCTION WATER SEAL

## (undated) DEVICE — DRAPE IOBAN 33" X 23"

## (undated) DEVICE — PACK OPEN HEART LNX

## (undated) DEVICE — CATH CV TRAY INSR ST UNIV

## (undated) DEVICE — PACING CABLE (BROWN) A/V TEMP SCREW DOWN 12FT

## (undated) DEVICE — PACK PROC CV DRAPE

## (undated) DEVICE — SUT VICRYL 0 27" CT

## (undated) DEVICE — SUT PROLENE 6-0 30" RB-2

## (undated) DEVICE — DRAPE PROBE COVER 5" X 96"

## (undated) DEVICE — TUBING SUCTION NONCONDUCTIVE 6MM X 12FT

## (undated) DEVICE — HEMOSTAT KELLY CURVED DISP

## (undated) DEVICE — TOURNIQUET SET 12FR (1 RED, 1 BLUE, 3 CLEAR, 1 SNARE) 7"

## (undated) DEVICE — TUBING EXTENSION HI PRESSURE FLEX 48"

## (undated) DEVICE — SUT STAINLESS STEEL 7 4-18" CCS

## (undated) DEVICE — RIGID ADULT SUCKER

## (undated) DEVICE — DRAPE SLUSH / WARMER 44 X 66"

## (undated) DEVICE — POSITIONER FOAM EGG CRATE ULNAR 2PCS (PINK)

## (undated) DEVICE — SUT VICRYL 4-0 18" PS-2 UNDYED

## (undated) DEVICE — SUT PROLENE 3-0 36" SH-1

## (undated) DEVICE — SUT VICRYL 1 36" CTX UNDYED

## (undated) DEVICE — DRAPE TOWEL BLUE 17" X 24"

## (undated) DEVICE — SYR MED A2000 SYRINGE KIT ACIST REUS

## (undated) DEVICE — SUT VICRYL 2-0 27" CT-1

## (undated) DEVICE — SUT SILK 5-0 60" TIES

## (undated) DEVICE — DRAPE SMALL W ADHESIVE APERTURE

## (undated) DEVICE — SUT PLEDGET 9MM X 4MM X 1.5MM

## (undated) DEVICE — SUT ETHIBOND 3-0 36" RB-1

## (undated) DEVICE — SUT PROLENE 5-0 24" RB-1

## (undated) DEVICE — CATH NG SALEM SUMP 16FR

## (undated) DEVICE — DRAPE SURGICAL #1010

## (undated) DEVICE — NDL PERCU ECHOTIP 21G X 4CM

## (undated) DEVICE — SUMP INTRACARDIAC/PERICARDIAL 20FR 1/4" ADULT

## (undated) DEVICE — SUT HOLDER INSERT FOR OCTOBASE STERNAL RETRACTOR

## (undated) DEVICE — WARMING BLANKET FULL UNDERBODY

## (undated) DEVICE — DRSG TRACH DRAINAGE 4X4

## (undated) DEVICE — ELCTR ZOLL DEFIBRILLATOR PAD NO REPLACEMENT

## (undated) DEVICE — SYS DEL CONTROLLER ANGIOTOUCH

## (undated) DEVICE — PACING CABLE (BLUE) ATRIAL TEMP SCREW DOWN 12FT

## (undated) DEVICE — SUT PLEDGET SOFT MEDIUM 1/4" X 1/8" X 1/16" X6

## (undated) DEVICE — BAND RADIAL COMPRESSION DEVICE REG 24CM

## (undated) DEVICE — SUT PROLENE 4-0 36" RB-1

## (undated) DEVICE — DRSG MEPILEX 10 X 25CM (4 X 10") AG

## (undated) DEVICE — SUT DOUBLE 6 WIRE STERNAL

## (undated) DEVICE — SUT NUROLON 1 18" OS-8 (POP-OFF)

## (undated) DEVICE — DRSG BIOPATCH DISK W CHG 1" W 4.0MM HOLE

## (undated) DEVICE — FOLEY TRAY 16FR 5CC LF LUBRISIL ADVANCE TEMP CLOSED

## (undated) DEVICE — SUT SILK 2-0 18" SH (POP-OFF)

## (undated) DEVICE — SUT STAINLESS STEEL 6 4-18" CCS

## (undated) DEVICE — DRSG QUICKCLOT HEMOSTATIC 4X4 FOIL

## (undated) DEVICE — MANIFOLD ANGIO AUTOMD TRNDCR

## (undated) DEVICE — DRAPE OR CAMERA COVER